# Patient Record
Sex: FEMALE | Race: BLACK OR AFRICAN AMERICAN | Employment: FULL TIME | URBAN - METROPOLITAN AREA
[De-identification: names, ages, dates, MRNs, and addresses within clinical notes are randomized per-mention and may not be internally consistent; named-entity substitution may affect disease eponyms.]

---

## 2017-02-15 ENCOUNTER — OFFICE VISIT (OUTPATIENT)
Dept: INTERNAL MEDICINE CLINIC | Age: 37
End: 2017-02-15

## 2017-02-15 VITALS
SYSTOLIC BLOOD PRESSURE: 100 MMHG | TEMPERATURE: 98.2 F | HEIGHT: 60 IN | DIASTOLIC BLOOD PRESSURE: 64 MMHG | WEIGHT: 234 LBS | BODY MASS INDEX: 45.94 KG/M2 | HEART RATE: 64 BPM

## 2017-02-15 DIAGNOSIS — J45.20 MILD INTERMITTENT ASTHMA WITHOUT COMPLICATION: Primary | ICD-10-CM

## 2017-02-15 PROCEDURE — 99213 OFFICE O/P EST LOW 20 MIN: CPT | Performed by: INTERNAL MEDICINE

## 2017-02-15 RX ORDER — ALBUTEROL SULFATE 90 UG/1
2 AEROSOL, METERED RESPIRATORY (INHALATION) EVERY 6 HOURS PRN
Qty: 1 INHALER | Refills: 3 | Status: SHIPPED | OUTPATIENT
Start: 2017-02-15 | End: 2017-04-06 | Stop reason: SDUPTHER

## 2017-02-15 RX ORDER — MONTELUKAST SODIUM 10 MG/1
10 TABLET ORAL DAILY
Qty: 90 TABLET | Refills: 3 | Status: SHIPPED | OUTPATIENT
Start: 2017-02-15 | End: 2017-04-06 | Stop reason: SDUPTHER

## 2017-02-15 ASSESSMENT — ENCOUNTER SYMPTOMS
CHOKING: 0
WHEEZING: 1
SHORTNESS OF BREATH: 0
COUGH: 0

## 2017-04-06 ENCOUNTER — TELEPHONE (OUTPATIENT)
Dept: INTERNAL MEDICINE CLINIC | Age: 37
End: 2017-04-06

## 2017-04-06 RX ORDER — ALBUTEROL SULFATE 90 UG/1
2 AEROSOL, METERED RESPIRATORY (INHALATION) EVERY 6 HOURS PRN
Qty: 3 INHALER | Refills: 1 | Status: SHIPPED | OUTPATIENT
Start: 2017-04-06 | End: 2018-12-12 | Stop reason: SDUPTHER

## 2017-04-06 RX ORDER — MONTELUKAST SODIUM 10 MG/1
10 TABLET ORAL DAILY
Qty: 90 TABLET | Refills: 1 | Status: SHIPPED | OUTPATIENT
Start: 2017-04-06 | End: 2018-02-08 | Stop reason: SDUPTHER

## 2017-05-09 ENCOUNTER — OFFICE VISIT (OUTPATIENT)
Dept: BARIATRICS/WEIGHT MGMT | Age: 37
End: 2017-05-09

## 2017-05-09 VITALS
SYSTOLIC BLOOD PRESSURE: 122 MMHG | WEIGHT: 236.5 LBS | DIASTOLIC BLOOD PRESSURE: 80 MMHG | HEART RATE: 68 BPM | BODY MASS INDEX: 46.43 KG/M2 | HEIGHT: 60 IN

## 2017-05-09 DIAGNOSIS — E66.01 MORBID OBESITY WITH BODY MASS INDEX (BMI) OF 45.0 TO 49.9 IN ADULT (HCC): Primary | ICD-10-CM

## 2017-05-09 PROCEDURE — 99213 OFFICE O/P EST LOW 20 MIN: CPT | Performed by: FAMILY MEDICINE

## 2017-05-09 ASSESSMENT — ENCOUNTER SYMPTOMS
RESPIRATORY NEGATIVE: 1
GASTROINTESTINAL NEGATIVE: 1
EYES NEGATIVE: 1

## 2017-05-10 DIAGNOSIS — E66.01 MORBID OBESITY WITH BODY MASS INDEX (BMI) OF 45.0 TO 49.9 IN ADULT (HCC): ICD-10-CM

## 2017-05-10 LAB
A/G RATIO: 1.4 (ref 1.1–2.2)
ALBUMIN SERPL-MCNC: 4 G/DL (ref 3.4–5)
ALP BLD-CCNC: 72 U/L (ref 40–129)
ALT SERPL-CCNC: 10 U/L (ref 10–40)
ANION GAP SERPL CALCULATED.3IONS-SCNC: 14 MMOL/L (ref 3–16)
AST SERPL-CCNC: 10 U/L (ref 15–37)
BASOPHILS ABSOLUTE: 0 K/UL (ref 0–0.2)
BASOPHILS RELATIVE PERCENT: 0.3 %
BILIRUB SERPL-MCNC: 0.5 MG/DL (ref 0–1)
BUN BLDV-MCNC: 12 MG/DL (ref 7–20)
CALCIUM SERPL-MCNC: 9.1 MG/DL (ref 8.3–10.6)
CHLORIDE BLD-SCNC: 104 MMOL/L (ref 99–110)
CHOLESTEROL, TOTAL: 144 MG/DL (ref 0–199)
CO2: 22 MMOL/L (ref 21–32)
CREAT SERPL-MCNC: 0.7 MG/DL (ref 0.6–1.1)
EOSINOPHILS ABSOLUTE: 0.7 K/UL (ref 0–0.6)
EOSINOPHILS RELATIVE PERCENT: 10.3 %
FOLATE: 10.8 NG/ML (ref 4.78–24.2)
GFR AFRICAN AMERICAN: >60
GFR NON-AFRICAN AMERICAN: >60
GLOBULIN: 2.8 G/DL
GLUCOSE BLD-MCNC: 88 MG/DL (ref 70–99)
HCT VFR BLD CALC: 42.1 % (ref 36–48)
HDLC SERPL-MCNC: 55 MG/DL (ref 40–60)
HEMOGLOBIN: 13.5 G/DL (ref 12–16)
LDL CHOLESTEROL CALCULATED: 79 MG/DL
LYMPHOCYTES ABSOLUTE: 1.7 K/UL (ref 1–5.1)
LYMPHOCYTES RELATIVE PERCENT: 25.3 %
MCH RBC QN AUTO: 29 PG (ref 26–34)
MCHC RBC AUTO-ENTMCNC: 32 G/DL (ref 31–36)
MCV RBC AUTO: 90.7 FL (ref 80–100)
MONOCYTES ABSOLUTE: 0.4 K/UL (ref 0–1.3)
MONOCYTES RELATIVE PERCENT: 5.4 %
NEUTROPHILS ABSOLUTE: 3.8 K/UL (ref 1.7–7.7)
NEUTROPHILS RELATIVE PERCENT: 58.7 %
PDW BLD-RTO: 14 % (ref 12.4–15.4)
PLATELET # BLD: 248 K/UL (ref 135–450)
PMV BLD AUTO: 8.2 FL (ref 5–10.5)
POTASSIUM SERPL-SCNC: 4.4 MMOL/L (ref 3.5–5.1)
RBC # BLD: 4.65 M/UL (ref 4–5.2)
SODIUM BLD-SCNC: 140 MMOL/L (ref 136–145)
TOTAL PROTEIN: 6.8 G/DL (ref 6.4–8.2)
TRIGL SERPL-MCNC: 50 MG/DL (ref 0–150)
TSH REFLEX: 1.07 UIU/ML (ref 0.27–4.2)
VITAMIN B-12: 306 PG/ML (ref 211–911)
VITAMIN D 25-HYDROXY: 16.4 NG/ML
VLDLC SERPL CALC-MCNC: 10 MG/DL
WBC # BLD: 6.5 K/UL (ref 4–11)

## 2017-05-11 LAB
ESTIMATED AVERAGE GLUCOSE: 111.2 MG/DL
HBA1C MFR BLD: 5.5 %

## 2017-06-06 ENCOUNTER — TELEPHONE (OUTPATIENT)
Dept: BARIATRICS/WEIGHT MGMT | Age: 37
End: 2017-06-06

## 2017-06-21 ENCOUNTER — OFFICE VISIT (OUTPATIENT)
Dept: BARIATRICS/WEIGHT MGMT | Age: 37
End: 2017-06-21

## 2017-06-21 VITALS
HEIGHT: 60 IN | WEIGHT: 233.5 LBS | BODY MASS INDEX: 45.84 KG/M2 | DIASTOLIC BLOOD PRESSURE: 80 MMHG | HEART RATE: 64 BPM | SYSTOLIC BLOOD PRESSURE: 118 MMHG

## 2017-06-21 DIAGNOSIS — Z71.3 DIETARY COUNSELING AND SURVEILLANCE: ICD-10-CM

## 2017-06-21 DIAGNOSIS — E55.9 VITAMIN D DEFICIENCY: ICD-10-CM

## 2017-06-21 DIAGNOSIS — E66.01 MORBID OBESITY WITH BODY MASS INDEX (BMI) OF 45.0 TO 49.9 IN ADULT (HCC): Primary | ICD-10-CM

## 2017-06-21 PROCEDURE — 99213 OFFICE O/P EST LOW 20 MIN: CPT | Performed by: FAMILY MEDICINE

## 2017-06-21 ASSESSMENT — ENCOUNTER SYMPTOMS
GASTROINTESTINAL NEGATIVE: 1
RESPIRATORY NEGATIVE: 1
EYES NEGATIVE: 1

## 2017-06-27 ENCOUNTER — TELEPHONE (OUTPATIENT)
Dept: INTERNAL MEDICINE CLINIC | Age: 37
End: 2017-06-27

## 2017-06-29 ENCOUNTER — OFFICE VISIT (OUTPATIENT)
Dept: INTERNAL MEDICINE CLINIC | Age: 37
End: 2017-06-29

## 2017-06-29 VITALS
BODY MASS INDEX: 45.75 KG/M2 | WEIGHT: 233 LBS | DIASTOLIC BLOOD PRESSURE: 60 MMHG | HEART RATE: 64 BPM | TEMPERATURE: 98.7 F | HEIGHT: 60 IN | SYSTOLIC BLOOD PRESSURE: 100 MMHG

## 2017-06-29 DIAGNOSIS — Z23 NEED FOR PNEUMOCOCCAL VACCINATION: ICD-10-CM

## 2017-06-29 DIAGNOSIS — J45.30 MILD PERSISTENT ASTHMA WITHOUT COMPLICATION: Primary | ICD-10-CM

## 2017-06-29 DIAGNOSIS — E66.01 MORBID OBESITY WITH BMI OF 45.0-49.9, ADULT (HCC): ICD-10-CM

## 2017-06-29 PROCEDURE — 90471 IMMUNIZATION ADMIN: CPT | Performed by: INTERNAL MEDICINE

## 2017-06-29 PROCEDURE — 90732 PPSV23 VACC 2 YRS+ SUBQ/IM: CPT | Performed by: INTERNAL MEDICINE

## 2017-06-29 PROCEDURE — 99213 OFFICE O/P EST LOW 20 MIN: CPT | Performed by: INTERNAL MEDICINE

## 2017-06-29 RX ORDER — BUDESONIDE AND FORMOTEROL FUMARATE DIHYDRATE 80; 4.5 UG/1; UG/1
2 AEROSOL RESPIRATORY (INHALATION) 2 TIMES DAILY
COMMUNITY
End: 2018-01-16 | Stop reason: SDUPTHER

## 2017-06-29 ASSESSMENT — ENCOUNTER SYMPTOMS: WHEEZING: 1

## 2017-09-11 ENCOUNTER — OFFICE VISIT (OUTPATIENT)
Dept: INTERNAL MEDICINE CLINIC | Age: 37
End: 2017-09-11

## 2017-09-11 VITALS
SYSTOLIC BLOOD PRESSURE: 102 MMHG | TEMPERATURE: 98 F | WEIGHT: 230 LBS | HEIGHT: 60 IN | DIASTOLIC BLOOD PRESSURE: 60 MMHG | BODY MASS INDEX: 45.16 KG/M2 | HEART RATE: 72 BPM

## 2017-09-11 DIAGNOSIS — J45.20 MILD INTERMITTENT ASTHMA WITHOUT COMPLICATION: ICD-10-CM

## 2017-09-11 DIAGNOSIS — Z00.00 ANNUAL PHYSICAL EXAM: Primary | ICD-10-CM

## 2017-09-11 PROCEDURE — 99395 PREV VISIT EST AGE 18-39: CPT | Performed by: INTERNAL MEDICINE

## 2017-09-11 ASSESSMENT — PATIENT HEALTH QUESTIONNAIRE - PHQ9
2. FEELING DOWN, DEPRESSED OR HOPELESS: 0
SUM OF ALL RESPONSES TO PHQ QUESTIONS 1-9: 0
1. LITTLE INTEREST OR PLEASURE IN DOING THINGS: 0
SUM OF ALL RESPONSES TO PHQ9 QUESTIONS 1 & 2: 0

## 2017-09-11 ASSESSMENT — ENCOUNTER SYMPTOMS
DIARRHEA: 0
ABDOMINAL DISTENTION: 0
COUGH: 0
CHEST TIGHTNESS: 0
WHEEZING: 0
SHORTNESS OF BREATH: 0
NAUSEA: 0
ABDOMINAL PAIN: 0

## 2017-11-14 RX ORDER — METRONIDAZOLE 500 MG/1
500 TABLET ORAL 2 TIMES DAILY
Qty: 14 TABLET | Refills: 0 | Status: SHIPPED | OUTPATIENT
Start: 2017-11-14 | End: 2017-11-21

## 2017-11-18 DIAGNOSIS — N39.0 URINARY TRACT INFECTION WITHOUT HEMATURIA, SITE UNSPECIFIED: Primary | ICD-10-CM

## 2017-11-18 DIAGNOSIS — R82.2 BILIRUBINURIA: ICD-10-CM

## 2017-11-18 LAB
A/G RATIO: 1.5 (ref 1.1–2.2)
ALBUMIN SERPL-MCNC: 4.3 G/DL (ref 3.4–5)
ALP BLD-CCNC: 69 U/L (ref 40–129)
ALT SERPL-CCNC: 13 U/L (ref 10–40)
ANION GAP SERPL CALCULATED.3IONS-SCNC: -6 MMOL/L (ref 3–16)
AST SERPL-CCNC: 12 U/L (ref 15–37)
BACTERIA: ABNORMAL /HPF
BILIRUB SERPL-MCNC: 0.7 MG/DL (ref 0–1)
BILIRUBIN URINE: ABNORMAL
BLOOD, URINE: NEGATIVE
BUN BLDV-MCNC: 9 MG/DL (ref 7–20)
CALCIUM SERPL-MCNC: 9.7 MG/DL (ref 8.3–10.6)
CHLORIDE BLD-SCNC: 115 MMOL/L (ref 99–110)
CLARITY: CLEAR
CO2: 24 MMOL/L (ref 21–32)
COLOR: ABNORMAL
CREAT SERPL-MCNC: 0.7 MG/DL (ref 0.6–1.1)
EPITHELIAL CELLS, UA: 3 /HPF (ref 0–5)
GFR AFRICAN AMERICAN: >60
GFR NON-AFRICAN AMERICAN: >60
GLOBULIN: 2.9 G/DL
GLUCOSE BLD-MCNC: 90 MG/DL (ref 70–99)
GLUCOSE URINE: NEGATIVE MG/DL
HYALINE CASTS: 1 /LPF (ref 0–8)
KETONES, URINE: NEGATIVE MG/DL
LEUKOCYTE ESTERASE, URINE: ABNORMAL
MICROSCOPIC EXAMINATION: YES
NITRITE, URINE: NEGATIVE
PH UA: 6
POTASSIUM SERPL-SCNC: 4.5 MMOL/L (ref 3.5–5.1)
PROTEIN UA: NEGATIVE MG/DL
RBC UA: 5 /HPF (ref 0–4)
SODIUM BLD-SCNC: 133 MMOL/L (ref 136–145)
SPECIFIC GRAVITY UA: 1.02
TOTAL PROTEIN: 7.2 G/DL (ref 6.4–8.2)
URINE TYPE: ABNORMAL
UROBILINOGEN, URINE: 0.2 E.U./DL
WBC UA: 4 /HPF (ref 0–5)

## 2017-11-22 LAB
ORGANISM: ABNORMAL
URINE CULTURE, ROUTINE: ABNORMAL
URINE CULTURE, ROUTINE: ABNORMAL

## 2018-01-16 ENCOUNTER — OFFICE VISIT (OUTPATIENT)
Dept: INTERNAL MEDICINE CLINIC | Age: 38
End: 2018-01-16

## 2018-01-16 VITALS
HEIGHT: 59 IN | HEART RATE: 60 BPM | BODY MASS INDEX: 47.21 KG/M2 | DIASTOLIC BLOOD PRESSURE: 62 MMHG | WEIGHT: 234.2 LBS | TEMPERATURE: 97.8 F | SYSTOLIC BLOOD PRESSURE: 100 MMHG

## 2018-01-16 DIAGNOSIS — F43.21 SITUATIONAL DEPRESSION: Primary | ICD-10-CM

## 2018-01-16 DIAGNOSIS — F41.1 GAD (GENERALIZED ANXIETY DISORDER): ICD-10-CM

## 2018-01-16 PROCEDURE — 99213 OFFICE O/P EST LOW 20 MIN: CPT | Performed by: INTERNAL MEDICINE

## 2018-01-16 PROCEDURE — 96127 BRIEF EMOTIONAL/BEHAV ASSMT: CPT | Performed by: INTERNAL MEDICINE

## 2018-01-16 RX ORDER — BUDESONIDE AND FORMOTEROL FUMARATE DIHYDRATE 80; 4.5 UG/1; UG/1
2 AEROSOL RESPIRATORY (INHALATION) 2 TIMES DAILY
Qty: 3 INHALER | Refills: 3 | Status: SHIPPED | OUTPATIENT
Start: 2018-01-16 | End: 2018-12-12 | Stop reason: SDUPTHER

## 2018-01-16 RX ORDER — CLONAZEPAM 0.25 MG/1
0.25 TABLET, ORALLY DISINTEGRATING ORAL NIGHTLY PRN
Qty: 30 TABLET | Refills: 0 | Status: SHIPPED | OUTPATIENT
Start: 2018-01-16 | End: 2018-09-13 | Stop reason: ALTCHOICE

## 2018-01-16 RX ORDER — ESCITALOPRAM OXALATE 10 MG/1
10 TABLET ORAL DAILY
Qty: 90 TABLET | Refills: 0 | Status: SHIPPED | OUTPATIENT
Start: 2018-01-16 | End: 2018-02-08 | Stop reason: SDUPTHER

## 2018-01-16 ASSESSMENT — PATIENT HEALTH QUESTIONNAIRE - PHQ9
2. FEELING DOWN, DEPRESSED OR HOPELESS: 2
5. POOR APPETITE OR OVEREATING: 3
8. MOVING OR SPEAKING SO SLOWLY THAT OTHER PEOPLE COULD HAVE NOTICED. OR THE OPPOSITE, BEING SO FIGETY OR RESTLESS THAT YOU HAVE BEEN MOVING AROUND A LOT MORE THAN USUAL: 1
1. LITTLE INTEREST OR PLEASURE IN DOING THINGS: 2
3. TROUBLE FALLING OR STAYING ASLEEP: 3
SUM OF ALL RESPONSES TO PHQ QUESTIONS 1-9: 19
7. TROUBLE CONCENTRATING ON THINGS, SUCH AS READING THE NEWSPAPER OR WATCHING TELEVISION: 3
9. THOUGHTS THAT YOU WOULD BE BETTER OFF DEAD, OR OF HURTING YOURSELF: 0
6. FEELING BAD ABOUT YOURSELF - OR THAT YOU ARE A FAILURE OR HAVE LET YOURSELF OR YOUR FAMILY DOWN: 3
SUM OF ALL RESPONSES TO PHQ9 QUESTIONS 1 & 2: 4
4. FEELING TIRED OR HAVING LITTLE ENERGY: 2

## 2018-01-16 ASSESSMENT — ANXIETY QUESTIONNAIRES
GAD7 TOTAL SCORE: 21
1. FEELING NERVOUS, ANXIOUS, OR ON EDGE: 3-NEARLY EVERY DAY
2. NOT BEING ABLE TO STOP OR CONTROL WORRYING: 3-NEARLY EVERY DAY
7. FEELING AFRAID AS IF SOMETHING AWFUL MIGHT HAPPEN: 3-NEARLY EVERY DAY
6. BECOMING EASILY ANNOYED OR IRRITABLE: 3-NEARLY EVERY DAY
4. TROUBLE RELAXING: 3-NEARLY EVERY DAY
3. WORRYING TOO MUCH ABOUT DIFFERENT THINGS: 3-NEARLY EVERY DAY
5. BEING SO RESTLESS THAT IT IS HARD TO SIT STILL: 3-NEARLY EVERY DAY

## 2018-02-08 ENCOUNTER — OFFICE VISIT (OUTPATIENT)
Dept: INTERNAL MEDICINE CLINIC | Age: 38
End: 2018-02-08

## 2018-02-08 VITALS
DIASTOLIC BLOOD PRESSURE: 68 MMHG | WEIGHT: 234 LBS | BODY MASS INDEX: 47.17 KG/M2 | RESPIRATION RATE: 16 BRPM | TEMPERATURE: 98.1 F | SYSTOLIC BLOOD PRESSURE: 110 MMHG | HEIGHT: 59 IN

## 2018-02-08 DIAGNOSIS — F32.A DEPRESSIVE DISORDER: Primary | ICD-10-CM

## 2018-02-08 PROCEDURE — 99213 OFFICE O/P EST LOW 20 MIN: CPT | Performed by: INTERNAL MEDICINE

## 2018-02-08 PROCEDURE — 96127 BRIEF EMOTIONAL/BEHAV ASSMT: CPT | Performed by: INTERNAL MEDICINE

## 2018-02-08 RX ORDER — ESCITALOPRAM OXALATE 20 MG/1
TABLET ORAL
Qty: 30 TABLET | Refills: 3 | Status: SHIPPED | OUTPATIENT
Start: 2018-02-08 | End: 2018-09-13 | Stop reason: ALTCHOICE

## 2018-02-08 RX ORDER — MONTELUKAST SODIUM 10 MG/1
10 TABLET ORAL DAILY
Qty: 90 TABLET | Refills: 1 | Status: SHIPPED | OUTPATIENT
Start: 2018-02-08 | End: 2018-12-12 | Stop reason: SDUPTHER

## 2018-02-08 ASSESSMENT — PATIENT HEALTH QUESTIONNAIRE - PHQ9
3. TROUBLE FALLING OR STAYING ASLEEP: 3
1. LITTLE INTEREST OR PLEASURE IN DOING THINGS: 2
5. POOR APPETITE OR OVEREATING: 3
9. THOUGHTS THAT YOU WOULD BE BETTER OFF DEAD, OR OF HURTING YOURSELF: 0
SUM OF ALL RESPONSES TO PHQ9 QUESTIONS 1 & 2: 4
6. FEELING BAD ABOUT YOURSELF - OR THAT YOU ARE A FAILURE OR HAVE LET YOURSELF OR YOUR FAMILY DOWN: 2
7. TROUBLE CONCENTRATING ON THINGS, SUCH AS READING THE NEWSPAPER OR WATCHING TELEVISION: 2
2. FEELING DOWN, DEPRESSED OR HOPELESS: 2
8. MOVING OR SPEAKING SO SLOWLY THAT OTHER PEOPLE COULD HAVE NOTICED. OR THE OPPOSITE, BEING SO FIGETY OR RESTLESS THAT YOU HAVE BEEN MOVING AROUND A LOT MORE THAN USUAL: 1
4. FEELING TIRED OR HAVING LITTLE ENERGY: 1
SUM OF ALL RESPONSES TO PHQ QUESTIONS 1-9: 16

## 2018-02-08 ASSESSMENT — ANXIETY QUESTIONNAIRES
6. BECOMING EASILY ANNOYED OR IRRITABLE: 2-OVER HALF THE DAYS
7. FEELING AFRAID AS IF SOMETHING AWFUL MIGHT HAPPEN: 3-NEARLY EVERY DAY
GAD7 TOTAL SCORE: 19
2. NOT BEING ABLE TO STOP OR CONTROL WORRYING: 3-NEARLY EVERY DAY
1. FEELING NERVOUS, ANXIOUS, OR ON EDGE: 3-NEARLY EVERY DAY
5. BEING SO RESTLESS THAT IT IS HARD TO SIT STILL: 2-OVER HALF THE DAYS
4. TROUBLE RELAXING: 3-NEARLY EVERY DAY
3. WORRYING TOO MUCH ABOUT DIFFERENT THINGS: 3-NEARLY EVERY DAY

## 2018-03-08 ENCOUNTER — TELEPHONE (OUTPATIENT)
Dept: INTERNAL MEDICINE CLINIC | Age: 38
End: 2018-03-08

## 2018-03-12 RX ORDER — FLUCONAZOLE 150 MG/1
150 TABLET ORAL ONCE
Qty: 1 TABLET | Refills: 0 | Status: SHIPPED | OUTPATIENT
Start: 2018-03-12 | End: 2018-03-12

## 2018-04-07 ENCOUNTER — OFFICE VISIT (OUTPATIENT)
Dept: INTERNAL MEDICINE CLINIC | Age: 38
End: 2018-04-07

## 2018-04-07 VITALS
HEIGHT: 59 IN | HEART RATE: 89 BPM | WEIGHT: 234 LBS | DIASTOLIC BLOOD PRESSURE: 80 MMHG | TEMPERATURE: 99 F | OXYGEN SATURATION: 99 % | BODY MASS INDEX: 47.17 KG/M2 | SYSTOLIC BLOOD PRESSURE: 120 MMHG

## 2018-04-07 DIAGNOSIS — R50.9 FEBRILE ILLNESS, ACUTE: ICD-10-CM

## 2018-04-07 DIAGNOSIS — J02.9 SORE THROAT: ICD-10-CM

## 2018-04-07 LAB
INFLUENZA A ANTIBODY: NORMAL
INFLUENZA B ANTIBODY: NORMAL
S PYO AG THROAT QL: NORMAL

## 2018-04-07 PROCEDURE — 87804 INFLUENZA ASSAY W/OPTIC: CPT | Performed by: INTERNAL MEDICINE

## 2018-04-07 PROCEDURE — 87880 STREP A ASSAY W/OPTIC: CPT | Performed by: INTERNAL MEDICINE

## 2018-04-07 PROCEDURE — 99213 OFFICE O/P EST LOW 20 MIN: CPT | Performed by: INTERNAL MEDICINE

## 2018-04-07 ASSESSMENT — ENCOUNTER SYMPTOMS
SINUS PAIN: 0
EYES NEGATIVE: 1
COUGH: 0
VOICE CHANGE: 0
RHINORRHEA: 0
SORE THROAT: 1
SHORTNESS OF BREATH: 0
GASTROINTESTINAL NEGATIVE: 1
TROUBLE SWALLOWING: 0
FACIAL SWELLING: 0
SINUS PRESSURE: 0

## 2018-04-17 ENCOUNTER — EMPLOYEE WELLNESS (OUTPATIENT)
Dept: OTHER | Age: 38
End: 2018-04-17

## 2018-04-17 LAB
CHOLESTEROL, TOTAL: 152 MG/DL (ref 0–199)
GLUCOSE BLD-MCNC: 78 MG/DL (ref 70–99)
HDLC SERPL-MCNC: 52 MG/DL (ref 40–60)
LDL CHOLESTEROL CALCULATED: 88 MG/DL
TRIGL SERPL-MCNC: 62 MG/DL (ref 0–150)

## 2018-04-23 VITALS — WEIGHT: 230 LBS | BODY MASS INDEX: 46.45 KG/M2

## 2018-09-13 ENCOUNTER — OFFICE VISIT (OUTPATIENT)
Dept: INTERNAL MEDICINE CLINIC | Age: 38
End: 2018-09-13

## 2018-09-13 VITALS
SYSTOLIC BLOOD PRESSURE: 96 MMHG | HEIGHT: 59 IN | TEMPERATURE: 98.3 F | BODY MASS INDEX: 45.64 KG/M2 | DIASTOLIC BLOOD PRESSURE: 62 MMHG | HEART RATE: 64 BPM | WEIGHT: 226.4 LBS

## 2018-09-13 DIAGNOSIS — Z00.00 ANNUAL PHYSICAL EXAM: Primary | ICD-10-CM

## 2018-09-13 DIAGNOSIS — E55.9 VITAMIN D DEFICIENCY: ICD-10-CM

## 2018-09-13 DIAGNOSIS — J45.20 MILD INTERMITTENT ASTHMA WITHOUT COMPLICATION: ICD-10-CM

## 2018-09-13 PROCEDURE — 99395 PREV VISIT EST AGE 18-39: CPT | Performed by: INTERNAL MEDICINE

## 2018-09-13 ASSESSMENT — ENCOUNTER SYMPTOMS
DIARRHEA: 0
ABDOMINAL DISTENTION: 0
VOMITING: 0
ABDOMINAL PAIN: 0
CONSTIPATION: 0
CHEST TIGHTNESS: 0
WHEEZING: 0
COUGH: 0
SHORTNESS OF BREATH: 0
NAUSEA: 0

## 2018-09-13 NOTE — PROGRESS NOTES
Subjective:      Patient ID: Lita Gonzalez is a 40 y.o. female. HPI  Here for annual  No recent health concerns  She has been using rescue inhalrt  4 times a week      Prior to Visit Medications    Medication Sig Taking? Authorizing Provider   montelukast (SINGULAIR) 10 MG tablet Take 1 tablet by mouth daily Yes Lea Love MD   Cholecalciferol (VITAMIN D3) 5000 units TABS Take 1 tablet by mouth daily Yes Lea Love MD   budesonide-formoterol (SYMBICORT) 80-4.5 MCG/ACT AERO Inhale 2 puffs into the lungs 2 times daily Yes Lea Love MD   albuterol sulfate HFA (PROVENTIL HFA) 108 (90 BASE) MCG/ACT inhaler Inhale 2 puffs into the lungs every 6 hours as needed for Wheezing Yes Lea Love MD     Patient's current med list, family, social history and past medical history reviewed and updated today  Health maintenance reviewed and appropriate screenings, immunizations discussed       Review of Systems   Constitutional: Negative for unexpected weight change (has lost weight intentionally on keto diet ). Respiratory: Negative for cough, chest tightness, shortness of breath and wheezing. Cardiovascular: Negative for chest pain, palpitations and leg swelling. Gastrointestinal: Negative for abdominal distention, abdominal pain, constipation, diarrhea, nausea and vomiting. Genitourinary: Negative for menstrual problem and vaginal bleeding. Neurological: Negative for dizziness, weakness, light-headedness and headaches. Objective:   Physical Exam   Constitutional: She is oriented to person, place, and time. She appears well-developed and well-nourished. HENT:   Right Ear: External ear normal.   Left Ear: External ear normal.   Mouth/Throat: No oropharyngeal exudate. Eyes: Pupils are equal, round, and reactive to light. Conjunctivae and EOM are normal.   Neck: No thyromegaly present. Cardiovascular: Normal rate, regular rhythm, normal heart sounds and intact distal pulses.

## 2018-12-12 RX ORDER — BUDESONIDE AND FORMOTEROL FUMARATE DIHYDRATE 80; 4.5 UG/1; UG/1
2 AEROSOL RESPIRATORY (INHALATION) 2 TIMES DAILY
Qty: 3 INHALER | Refills: 3 | Status: SHIPPED | OUTPATIENT
Start: 2018-12-12

## 2018-12-12 RX ORDER — MONTELUKAST SODIUM 10 MG/1
10 TABLET ORAL DAILY
Qty: 90 TABLET | Refills: 1 | Status: SHIPPED | OUTPATIENT
Start: 2018-12-12 | End: 2020-03-18 | Stop reason: SDUPTHER

## 2018-12-12 RX ORDER — ALBUTEROL SULFATE 90 UG/1
2 AEROSOL, METERED RESPIRATORY (INHALATION) EVERY 6 HOURS PRN
Qty: 3 INHALER | Refills: 1 | Status: SHIPPED | OUTPATIENT
Start: 2018-12-12 | End: 2020-03-18 | Stop reason: SDUPTHER

## 2019-03-11 ENCOUNTER — HOSPITAL ENCOUNTER (OUTPATIENT)
Dept: GENERAL RADIOLOGY | Age: 39
Discharge: HOME | End: 2019-03-11
Payer: COMMERCIAL

## 2019-03-11 ENCOUNTER — HOSPITAL ENCOUNTER (OUTPATIENT)
Dept: VASCULAR LAB | Age: 39
Discharge: HOME | End: 2019-03-11
Payer: COMMERCIAL

## 2019-03-11 DIAGNOSIS — R09.89 SUSPECTED DEEP VEIN THROMBOSIS (DVT): ICD-10-CM

## 2019-03-11 DIAGNOSIS — R09.89 SUSPECTED DEEP VEIN THROMBOSIS (DVT): Primary | ICD-10-CM

## 2019-03-11 PROCEDURE — 93971 EXTREMITY STUDY: CPT

## 2019-03-11 PROCEDURE — 73562 X-RAY EXAM OF KNEE 3: CPT

## 2019-03-13 ENCOUNTER — OFFICE VISIT (OUTPATIENT)
Dept: INTERNAL MEDICINE CLINIC | Age: 39
End: 2019-03-13
Payer: COMMERCIAL

## 2019-03-13 VITALS — OXYGEN SATURATION: 97 % | DIASTOLIC BLOOD PRESSURE: 62 MMHG | HEART RATE: 98 BPM | SYSTOLIC BLOOD PRESSURE: 112 MMHG

## 2019-03-13 DIAGNOSIS — R41.3 MEMORY CHANGES: ICD-10-CM

## 2019-03-13 DIAGNOSIS — Z00.00 PHYSICAL EXAM, ANNUAL: Primary | ICD-10-CM

## 2019-03-13 DIAGNOSIS — M25.562 CHRONIC PAIN OF LEFT KNEE: ICD-10-CM

## 2019-03-13 DIAGNOSIS — G89.29 CHRONIC PAIN OF LEFT KNEE: ICD-10-CM

## 2019-03-13 DIAGNOSIS — Z72.821 INADEQUATE SLEEP HYGIENE: ICD-10-CM

## 2019-03-13 PROCEDURE — 99395 PREV VISIT EST AGE 18-39: CPT | Performed by: INTERNAL MEDICINE

## 2019-03-13 ASSESSMENT — PATIENT HEALTH QUESTIONNAIRE - PHQ9
2. FEELING DOWN, DEPRESSED OR HOPELESS: 0
SUM OF ALL RESPONSES TO PHQ9 QUESTIONS 1 & 2: 0
1. LITTLE INTEREST OR PLEASURE IN DOING THINGS: 0
SUM OF ALL RESPONSES TO PHQ QUESTIONS 1-9: 0
SUM OF ALL RESPONSES TO PHQ QUESTIONS 1-9: 0

## 2019-03-20 DIAGNOSIS — M25.561 ACUTE PAIN OF RIGHT KNEE: Primary | ICD-10-CM

## 2019-03-27 DIAGNOSIS — E78.2 MIXED HYPERLIPIDEMIA: ICD-10-CM

## 2019-03-27 DIAGNOSIS — E55.9 VITAMIN D DEFICIENCY: ICD-10-CM

## 2019-03-27 DIAGNOSIS — M19.90 ARTHRITIS: ICD-10-CM

## 2019-03-27 DIAGNOSIS — I10 ESSENTIAL HYPERTENSION: Primary | ICD-10-CM

## 2019-03-31 ASSESSMENT — ENCOUNTER SYMPTOMS
EYES NEGATIVE: 1
GASTROINTESTINAL NEGATIVE: 1

## 2019-09-23 DIAGNOSIS — M72.2 PLANTAR FASCIITIS, BILATERAL: Primary | ICD-10-CM

## 2019-09-24 ENCOUNTER — OFFICE VISIT (OUTPATIENT)
Dept: ORTHOPEDIC SURGERY | Age: 39
End: 2019-09-24
Payer: COMMERCIAL

## 2019-09-24 VITALS — WEIGHT: 236 LBS | BODY MASS INDEX: 47.58 KG/M2 | HEIGHT: 59 IN

## 2019-09-24 DIAGNOSIS — M25.561 CHRONIC PAIN OF BOTH KNEES: Primary | ICD-10-CM

## 2019-09-24 DIAGNOSIS — G89.29 CHRONIC PAIN OF RIGHT KNEE: ICD-10-CM

## 2019-09-24 DIAGNOSIS — M25.561 CHRONIC PAIN OF RIGHT KNEE: ICD-10-CM

## 2019-09-24 DIAGNOSIS — M17.11 PRIMARY OSTEOARTHRITIS OF RIGHT KNEE: Primary | ICD-10-CM

## 2019-09-24 DIAGNOSIS — M25.562 CHRONIC PAIN OF BOTH KNEES: Primary | ICD-10-CM

## 2019-09-24 DIAGNOSIS — G89.29 CHRONIC PAIN OF BOTH KNEES: Primary | ICD-10-CM

## 2019-09-24 PROCEDURE — 99203 OFFICE O/P NEW LOW 30 MIN: CPT | Performed by: PHYSICIAN ASSISTANT

## 2019-09-24 RX ORDER — NAPROXEN 500 MG/1
500 TABLET ORAL 2 TIMES DAILY
Qty: 60 TABLET | Refills: 1 | Status: SHIPPED | OUTPATIENT
Start: 2019-09-24 | End: 2021-07-17

## 2019-09-24 NOTE — PROGRESS NOTES
Patient Name:Le Narayanan  Medical Record Number: [id number]  YOB: 1980  Date of Encounter: 9/24/2019     Chief Complaint   Patient presents with    New Patient     Right knee pain x7-8n months with significant worsening over past few weeks. History of Present Illness:  Angelo Diallo is a 45 y.o. female here for evaluation of her right knee. Her pain assessment is documented below and I reviewed this with her today. Patient states she has been having right knee pain for the last 8 months however this has worsened over the last several weeks. She denies any known injury to the right knee but states she has been running 6 miles at least 3 days/week. Patient weighs 236 pounds with a BMI of 47.67. She states most of her pain is along the medial aspect of her right knee but radiates to the posterior knee. She does get some swelling with activity. She denies pain in the right hip or ankle.     Pain Assessment  Location of Pain: Knee  Location Modifiers: Right, Anterior, Superior  Severity of Pain: 10  Quality of Pain: Sharp, Throbbing, Cracking, Popping, Buckling  Duration of Pain: Persistent  Frequency of Pain: Constant  Limiting Behavior: No  Result of Injury: No  Work-Related Injury: No  Are there other pain locations you wish to document?: No    Past Medical History:   Diagnosis Date    Asthma     Gallstones     Nausea & vomiting        Past Surgical History:   Procedure Laterality Date    BREAST REDUCTION SURGERY      CHOLECYSTECTOMY      CYST REMOVAL Right     arm       Current Outpatient Medications   Medication Sig Dispense Refill    naproxen (NAPROSYN) 500 MG tablet Take 1 tablet by mouth 2 times daily 60 tablet 1    diclofenac (FLECTOR) 1.3 % patch Lot [id number] exp 11/2019 6 patch 0    albuterol sulfate HFA (PROVENTIL HFA) 108 (90 Base) MCG/ACT inhaler Inhale 2 puffs into the lungs every 6 hours as needed for Wheezing 3 Inhaler 1    budesonide-formoterol (SYMBICORT) 80-4.5 MCG/ACT AERO Inhale 2 puffs into the lungs 2 times daily 3 Inhaler 3    montelukast (SINGULAIR) 10 MG tablet Take 1 tablet by mouth daily 90 tablet 1    Cholecalciferol (VITAMIN D3) 5000 units TABS Take 1 tablet by mouth daily 30 tablet 2     No current facility-administered medications for this visit. Allergies, social and family histories were reviewed and updated as appropriate. Review of Systems:  Relevant review of systems reviewed and available in the patient's chart under the 'MEDIA' tab. Vital Signs:  Ht 4' 11\" (1.499 m)   Wt 236 lb (107 kg)   BMI 47.67 kg/m²     General Exam:   Constitutional: Patient is adequately groomed with no evidence of malnutrition  Mental Status: The patient is oriented to time, place and person. The patient's mood and affect are appropriate. Lymphatic: The lymphatic examination bilaterally reveals all areas to be without enlargement or induration. Neurological: The patient has good coordination and balance. There is no focal weakness or sensory deficit. Right Knee Examination:    Inspection: Normal muscle contours and no significant limb length discrepancy. No gross atrophy in any particular myotome. There is no obvious swelling or joint effusion of the knee. There are no abrasions, lacerations, contusions, hematomas or ecchymosis. There is no erythema, induration or warmth to suggest an infectious process. Palpation: Patient has tenderness on palpation over the medial joint line. There is mild patellofemoral crepitus. Range of Motion:    Flexion: 115°   Extension: 0°    Strength:  Quad 5 / 5  ; Hamstrings 5 / 5.   Gross motor to hip and ankle intact    Special Tests:    Lachman (ACL) - negative   Posterior Lachman (PCL) - negative    Anterior Drawer (ACL) - negative   Posterior Drawer (PCL) - negative   Pivot shift (ACL) - negative    Posterior sag (PCL) - negative   Kim's Test (Meniscus) - negative   Homans Test (Thrombophlebitis)-

## 2019-11-08 ENCOUNTER — OFFICE VISIT (OUTPATIENT)
Dept: GYNECOLOGY | Age: 39
End: 2019-11-08
Payer: COMMERCIAL

## 2019-11-08 VITALS
DIASTOLIC BLOOD PRESSURE: 64 MMHG | HEART RATE: 76 BPM | HEIGHT: 59 IN | SYSTOLIC BLOOD PRESSURE: 108 MMHG | BODY MASS INDEX: 47.43 KG/M2 | RESPIRATION RATE: 16 BRPM | WEIGHT: 235.25 LBS

## 2019-11-08 DIAGNOSIS — R39.9 UTI SYMPTOMS: ICD-10-CM

## 2019-11-08 DIAGNOSIS — Z11.3 SCREEN FOR STD (SEXUALLY TRANSMITTED DISEASE): ICD-10-CM

## 2019-11-08 DIAGNOSIS — N93.9 ABNORMAL UTERINE BLEEDING: ICD-10-CM

## 2019-11-08 DIAGNOSIS — Z01.419 WELL WOMAN EXAM WITH ROUTINE GYNECOLOGICAL EXAM: Primary | ICD-10-CM

## 2019-11-08 LAB
BACTERIA WET PREP: NORMAL
CLUE CELLS: NORMAL
CONTROL: NORMAL
EPITHELIAL CELLS WET PREP: NORMAL
PREGNANCY TEST URINE, POC: NEGATIVE
RBC WET PREP: NORMAL
SOURCE WET PREP: NORMAL
TRICHOMONAS PREP: NORMAL
WBC WET PREP: NORMAL
YEAST WET PREP: NORMAL

## 2019-11-08 PROCEDURE — 81025 URINE PREGNANCY TEST: CPT | Performed by: OBSTETRICS & GYNECOLOGY

## 2019-11-08 PROCEDURE — 99385 PREV VISIT NEW AGE 18-39: CPT | Performed by: OBSTETRICS & GYNECOLOGY

## 2019-11-08 ASSESSMENT — ENCOUNTER SYMPTOMS
TROUBLE SWALLOWING: 0
ANAL BLEEDING: 0
BLOOD IN STOOL: 0
COUGH: 0
DIARRHEA: 0
NAUSEA: 0
APNEA: 0
VOMITING: 0
SORE THROAT: 0
CONSTIPATION: 0
BACK PAIN: 0
RECTAL PAIN: 0
SHORTNESS OF BREATH: 0
WHEEZING: 0
CHEST TIGHTNESS: 0
ABDOMINAL PAIN: 1
PHOTOPHOBIA: 0
COLOR CHANGE: 0
ABDOMINAL DISTENTION: 0

## 2019-11-10 LAB — URINE CULTURE, ROUTINE: NORMAL

## 2019-11-12 LAB
C TRACH DNA GENITAL QL NAA+PROBE: NEGATIVE
HPV COMMENT: NORMAL
HPV TYPE 16: NOT DETECTED
HPV TYPE 18: NOT DETECTED
HPVOH (OTHER TYPES): NOT DETECTED
N. GONORRHOEAE DNA: NEGATIVE

## 2019-11-15 ENCOUNTER — TELEPHONE (OUTPATIENT)
Dept: PRIMARY CARE CLINIC | Age: 39
End: 2019-11-15

## 2019-12-19 ENCOUNTER — TELEPHONE (OUTPATIENT)
Dept: GYNECOLOGY | Age: 39
End: 2019-12-19

## 2019-12-19 ENCOUNTER — TELEPHONE (OUTPATIENT)
Dept: PRIMARY CARE CLINIC | Age: 39
End: 2019-12-19

## 2020-01-03 ENCOUNTER — TELEPHONE (OUTPATIENT)
Dept: GYNECOLOGY | Age: 40
End: 2020-01-03

## 2020-01-13 ENCOUNTER — TELEPHONE (OUTPATIENT)
Dept: GYNECOLOGY | Age: 40
End: 2020-01-13

## 2020-01-14 DIAGNOSIS — E55.9 VITAMIN D DEFICIENCY: ICD-10-CM

## 2020-01-14 DIAGNOSIS — M19.90 ARTHRITIS: ICD-10-CM

## 2020-01-14 DIAGNOSIS — I10 ESSENTIAL HYPERTENSION: ICD-10-CM

## 2020-01-14 DIAGNOSIS — E78.2 MIXED HYPERLIPIDEMIA: ICD-10-CM

## 2020-01-14 LAB
A/G RATIO: 1.5 (ref 1.1–2.2)
ALBUMIN SERPL-MCNC: 4.3 G/DL (ref 3.4–5)
ALP BLD-CCNC: 67 U/L (ref 40–129)
ALT SERPL-CCNC: 16 U/L (ref 10–40)
ANION GAP SERPL CALCULATED.3IONS-SCNC: 16 MMOL/L (ref 3–16)
AST SERPL-CCNC: 16 U/L (ref 15–37)
BILIRUB SERPL-MCNC: 0.9 MG/DL (ref 0–1)
BUN BLDV-MCNC: 6 MG/DL (ref 7–20)
CALCIUM SERPL-MCNC: 9.6 MG/DL (ref 8.3–10.6)
CHLORIDE BLD-SCNC: 107 MMOL/L (ref 99–110)
CHOLESTEROL, TOTAL: 135 MG/DL (ref 0–199)
CO2: 20 MMOL/L (ref 21–32)
CREAT SERPL-MCNC: 0.8 MG/DL (ref 0.6–1.1)
GFR AFRICAN AMERICAN: >60
GFR NON-AFRICAN AMERICAN: >60
GLOBULIN: 2.8 G/DL
GLUCOSE BLD-MCNC: 101 MG/DL (ref 70–99)
HCT VFR BLD CALC: 40.5 % (ref 36–48)
HDLC SERPL-MCNC: 56 MG/DL (ref 40–60)
HEMOGLOBIN: 13.5 G/DL (ref 12–16)
LDL CHOLESTEROL CALCULATED: 66 MG/DL
MCH RBC QN AUTO: 29.9 PG (ref 26–34)
MCHC RBC AUTO-ENTMCNC: 33.3 G/DL (ref 31–36)
MCV RBC AUTO: 89.8 FL (ref 80–100)
PDW BLD-RTO: 13.3 % (ref 12.4–15.4)
PLATELET # BLD: 284 K/UL (ref 135–450)
PMV BLD AUTO: 8.5 FL (ref 5–10.5)
POTASSIUM SERPL-SCNC: 4.6 MMOL/L (ref 3.5–5.1)
RBC # BLD: 4.51 M/UL (ref 4–5.2)
SEDIMENTATION RATE, ERYTHROCYTE: 10 MM/HR (ref 0–20)
SODIUM BLD-SCNC: 143 MMOL/L (ref 136–145)
TOTAL PROTEIN: 7.1 G/DL (ref 6.4–8.2)
TRIGL SERPL-MCNC: 64 MG/DL (ref 0–150)
TSH REFLEX: 1.12 UIU/ML (ref 0.27–4.2)
VITAMIN D 25-HYDROXY: 28.6 NG/ML
VLDLC SERPL CALC-MCNC: 13 MG/DL
WBC # BLD: 5.6 K/UL (ref 4–11)

## 2020-03-11 ENCOUNTER — EMPLOYEE WELLNESS (OUTPATIENT)
Dept: OTHER | Age: 40
End: 2020-03-11

## 2020-03-11 LAB
CHOLESTEROL, TOTAL: 137 MG/DL (ref 0–199)
GLUCOSE BLD-MCNC: 89 MG/DL (ref 70–99)
HDLC SERPL-MCNC: 59 MG/DL (ref 40–60)
LDL CHOLESTEROL CALCULATED: 69 MG/DL
TRIGL SERPL-MCNC: 47 MG/DL (ref 0–150)

## 2020-03-14 LAB
3-OH-COTININE: <2 NG/ML
COTININE: <2 NG/ML
NICOTINE: <2 NG/ML

## 2020-03-18 ENCOUNTER — TELEPHONE (OUTPATIENT)
Dept: INTERNAL MEDICINE CLINIC | Age: 40
End: 2020-03-18

## 2020-03-18 RX ORDER — ALBUTEROL SULFATE 90 UG/1
2 AEROSOL, METERED RESPIRATORY (INHALATION) EVERY 6 HOURS PRN
Qty: 3 INHALER | Refills: 3 | Status: SHIPPED | OUTPATIENT
Start: 2020-03-18 | End: 2021-07-10 | Stop reason: SDUPTHER

## 2020-03-18 RX ORDER — MONTELUKAST SODIUM 10 MG/1
10 TABLET ORAL DAILY
Qty: 90 TABLET | Refills: 3 | Status: SHIPPED | OUTPATIENT
Start: 2020-03-18 | End: 2021-09-01 | Stop reason: SDUPTHER

## 2020-03-20 ENCOUNTER — TELEPHONE (OUTPATIENT)
Dept: INTERNAL MEDICINE CLINIC | Age: 40
End: 2020-03-20

## 2020-03-26 ENCOUNTER — TELEPHONE (OUTPATIENT)
Dept: INTERNAL MEDICINE CLINIC | Age: 40
End: 2020-03-26

## 2020-04-14 ENCOUNTER — TELEPHONE (OUTPATIENT)
Dept: INTERNAL MEDICINE CLINIC | Age: 40
End: 2020-04-14

## 2020-05-14 ENCOUNTER — TELEPHONE (OUTPATIENT)
Dept: INTERNAL MEDICINE CLINIC | Age: 40
End: 2020-05-14

## 2020-05-18 ENCOUNTER — TELEPHONE (OUTPATIENT)
Dept: INTERNAL MEDICINE CLINIC | Age: 40
End: 2020-05-18

## 2020-05-28 ENCOUNTER — PATIENT MESSAGE (OUTPATIENT)
Dept: INTERNAL MEDICINE CLINIC | Age: 40
End: 2020-05-28

## 2020-05-28 RX ORDER — NORETHINDRONE ACETATE AND ETHINYL ESTRADIOL AND FERROUS FUMARATE 1MG-20(24)
KIT ORAL
Qty: 2 PACKET | Refills: 0 | Status: SHIPPED | OUTPATIENT
Start: 2020-05-28 | End: 2020-10-07 | Stop reason: ALTCHOICE

## 2020-05-29 RX ORDER — NORGESTIMATE AND ETHINYL ESTRADIOL 0.25-0.035
KIT ORAL
Qty: 2 PACKET | Refills: 0 | Status: SHIPPED
Start: 2020-05-29 | End: 2020-10-07 | Stop reason: ALTCHOICE

## 2020-09-08 DIAGNOSIS — Z12.31 ENCOUNTER FOR SCREENING MAMMOGRAM FOR MALIGNANT NEOPLASM OF BREAST: ICD-10-CM

## 2020-09-08 DIAGNOSIS — R21 RASH: Primary | ICD-10-CM

## 2020-09-08 DIAGNOSIS — N93.9 ABNORMAL UTERINE BLEEDING: ICD-10-CM

## 2020-10-07 RX ORDER — LEVONORGESTREL AND ETHINYL ESTRADIOL 0.1-0.02MG
1 KIT ORAL DAILY
Qty: 1 PACKET | Refills: 3 | Status: SHIPPED | OUTPATIENT
Start: 2020-10-07 | End: 2020-10-07 | Stop reason: ALTCHOICE

## 2020-10-07 RX ORDER — LEVONORGESTREL AND ETHINYL ESTRADIOL 0.1-0.02MG
1 KIT ORAL DAILY
Qty: 1 PACKET | Refills: 12 | Status: SHIPPED | OUTPATIENT
Start: 2020-10-07 | End: 2021-08-10

## 2020-10-19 VITALS — BODY MASS INDEX: 46.45 KG/M2 | WEIGHT: 230 LBS

## 2020-11-16 RX ORDER — FLUCONAZOLE 150 MG/1
150 TABLET ORAL ONCE
Qty: 1 TABLET | Refills: 1 | Status: SHIPPED | OUTPATIENT
Start: 2020-11-16 | End: 2020-11-16

## 2020-11-16 RX ORDER — FLUCONAZOLE 150 MG/1
150 TABLET ORAL ONCE
Qty: 1 TABLET | Refills: 0 | Status: SHIPPED | OUTPATIENT
Start: 2020-11-16 | End: 2020-11-16 | Stop reason: SDUPTHER

## 2020-11-23 RX ORDER — VALACYCLOVIR HYDROCHLORIDE 1 G/1
1000 TABLET, FILM COATED ORAL 2 TIMES DAILY
Qty: 14 TABLET | Refills: 0 | Status: SHIPPED | OUTPATIENT
Start: 2020-11-23 | End: 2020-11-30

## 2021-03-24 ENCOUNTER — HOSPITAL ENCOUNTER (OUTPATIENT)
Dept: MAMMOGRAPHY | Age: 41
Discharge: HOME | End: 2021-03-29
Payer: COMMERCIAL

## 2021-03-24 ENCOUNTER — TELEPHONE (OUTPATIENT)
Dept: INTERNAL MEDICINE CLINIC | Age: 41
End: 2021-03-24

## 2021-03-24 DIAGNOSIS — Z12.31 VISIT FOR SCREENING MAMMOGRAM: ICD-10-CM

## 2021-03-24 PROCEDURE — 77063 BREAST TOMOSYNTHESIS BI: CPT

## 2021-07-12 RX ORDER — ALBUTEROL SULFATE 90 UG/1
2 AEROSOL, METERED RESPIRATORY (INHALATION) EVERY 6 HOURS PRN
Qty: 3 INHALER | Refills: 3 | Status: SHIPPED | OUTPATIENT
Start: 2021-07-12

## 2021-07-14 DIAGNOSIS — M19.90 ARTHRITIS: ICD-10-CM

## 2021-07-14 DIAGNOSIS — Z00.00 ENCOUNTER FOR WELLNESS EXAMINATION IN ADULT: Primary | ICD-10-CM

## 2021-07-14 DIAGNOSIS — R73.9 ELEVATED BLOOD SUGAR: ICD-10-CM

## 2021-07-14 DIAGNOSIS — E55.9 VITAMIN D DEFICIENCY: ICD-10-CM

## 2021-07-15 ENCOUNTER — OFFICE VISIT (OUTPATIENT)
Dept: INTERNAL MEDICINE CLINIC | Age: 41
End: 2021-07-15
Payer: COMMERCIAL

## 2021-07-15 VITALS
HEIGHT: 60 IN | DIASTOLIC BLOOD PRESSURE: 60 MMHG | BODY MASS INDEX: 41.03 KG/M2 | HEART RATE: 57 BPM | OXYGEN SATURATION: 96 % | SYSTOLIC BLOOD PRESSURE: 114 MMHG | WEIGHT: 209 LBS

## 2021-07-15 DIAGNOSIS — D50.8 OTHER IRON DEFICIENCY ANEMIA: ICD-10-CM

## 2021-07-15 DIAGNOSIS — E55.9 VITAMIN D DEFICIENCY: ICD-10-CM

## 2021-07-15 DIAGNOSIS — Z00.00 ENCOUNTER FOR WELLNESS EXAMINATION IN ADULT: ICD-10-CM

## 2021-07-15 DIAGNOSIS — J45.20 MILD INTERMITTENT ASTHMA WITHOUT COMPLICATION: ICD-10-CM

## 2021-07-15 DIAGNOSIS — M17.0 PRIMARY OSTEOARTHRITIS OF BOTH KNEES: ICD-10-CM

## 2021-07-15 DIAGNOSIS — Z00.00 PHYSICAL EXAM, ANNUAL: Primary | ICD-10-CM

## 2021-07-15 DIAGNOSIS — R73.9 ELEVATED BLOOD SUGAR: ICD-10-CM

## 2021-07-15 DIAGNOSIS — M19.90 ARTHRITIS: ICD-10-CM

## 2021-07-15 LAB
A/G RATIO: 1.3 (ref 1.1–2.2)
ALBUMIN SERPL-MCNC: 4.2 G/DL (ref 3.4–5)
ALP BLD-CCNC: 73 U/L (ref 40–129)
ALT SERPL-CCNC: 26 U/L (ref 10–40)
ANION GAP SERPL CALCULATED.3IONS-SCNC: 11 MMOL/L (ref 3–16)
AST SERPL-CCNC: 21 U/L (ref 15–37)
BASOPHILS ABSOLUTE: 0 K/UL (ref 0–0.2)
BASOPHILS RELATIVE PERCENT: 0.4 %
BILIRUB SERPL-MCNC: 0.5 MG/DL (ref 0–1)
BUN BLDV-MCNC: 10 MG/DL (ref 7–20)
C-REACTIVE PROTEIN: 5.4 MG/L (ref 0–5.1)
CALCIUM SERPL-MCNC: 9.7 MG/DL (ref 8.3–10.6)
CHLORIDE BLD-SCNC: 106 MMOL/L (ref 99–110)
CHOLESTEROL, TOTAL: 137 MG/DL (ref 0–199)
CO2: 22 MMOL/L (ref 21–32)
CREAT SERPL-MCNC: 0.9 MG/DL (ref 0.6–1.1)
EOSINOPHILS ABSOLUTE: 0.4 K/UL (ref 0–0.6)
EOSINOPHILS RELATIVE PERCENT: 5.7 %
FERRITIN: 77.2 NG/ML (ref 15–150)
GFR AFRICAN AMERICAN: >60
GFR NON-AFRICAN AMERICAN: >60
GLOBULIN: 3.3 G/DL
GLUCOSE BLD-MCNC: 86 MG/DL (ref 70–99)
GLUCOSE BLD-MCNC: 91 MG/DL (ref 70–99)
HCT VFR BLD CALC: 41.1 % (ref 36–48)
HDLC SERPL-MCNC: 60 MG/DL (ref 40–60)
HEMOGLOBIN: 13.9 G/DL (ref 12–16)
IRON SATURATION: 20 % (ref 15–50)
IRON: 69 UG/DL (ref 37–145)
LDL CHOLESTEROL CALCULATED: 68 MG/DL
LYMPHOCYTES ABSOLUTE: 1 K/UL (ref 1–5.1)
LYMPHOCYTES RELATIVE PERCENT: 13.6 %
MCH RBC QN AUTO: 30.8 PG (ref 26–34)
MCHC RBC AUTO-ENTMCNC: 33.8 G/DL (ref 31–36)
MCV RBC AUTO: 91.3 FL (ref 80–100)
MONOCYTES ABSOLUTE: 0.3 K/UL (ref 0–1.3)
MONOCYTES RELATIVE PERCENT: 4.2 %
NEUTROPHILS ABSOLUTE: 5.4 K/UL (ref 1.7–7.7)
NEUTROPHILS RELATIVE PERCENT: 76.1 %
PDW BLD-RTO: 13.6 % (ref 12.4–15.4)
PLATELET # BLD: 226 K/UL (ref 135–450)
PMV BLD AUTO: 7.8 FL (ref 5–10.5)
POTASSIUM SERPL-SCNC: 4.5 MMOL/L (ref 3.5–5.1)
RBC # BLD: 4.51 M/UL (ref 4–5.2)
SODIUM BLD-SCNC: 139 MMOL/L (ref 136–145)
TOTAL IRON BINDING CAPACITY: 348 UG/DL (ref 260–445)
TOTAL PROTEIN: 7.5 G/DL (ref 6.4–8.2)
TRIGL SERPL-MCNC: 47 MG/DL (ref 0–150)
VITAMIN D 25-HYDROXY: 34.3 NG/ML
WBC # BLD: 7.1 K/UL (ref 4–11)

## 2021-07-15 PROCEDURE — 99396 PREV VISIT EST AGE 40-64: CPT | Performed by: INTERNAL MEDICINE

## 2021-07-15 SDOH — ECONOMIC STABILITY: FOOD INSECURITY: WITHIN THE PAST 12 MONTHS, THE FOOD YOU BOUGHT JUST DIDN'T LAST AND YOU DIDN'T HAVE MONEY TO GET MORE.: NEVER TRUE

## 2021-07-15 SDOH — EDUCATIONAL SECURITY: EDUCATION ATTAINMENT
WHAT IS THE HIGHEST LEVEL OF SCHOOL YOU HAVE COMPLETED OR THE HIGHEST DEGREE YOU HAVE RECEIVED?: ASSOCIATE DEGREE: ACADEMIC PROGRAM

## 2021-07-15 SDOH — ECONOMIC STABILITY: FOOD INSECURITY: WITHIN THE PAST 12 MONTHS, YOU WORRIED THAT YOUR FOOD WOULD RUN OUT BEFORE YOU GOT MONEY TO BUY MORE.: NEVER TRUE

## 2021-07-15 SDOH — ECONOMIC STABILITY: INCOME INSECURITY: HOW HARD IS IT FOR YOU TO PAY FOR THE VERY BASICS LIKE FOOD, HOUSING, MEDICAL CARE, AND HEATING?: NOT HARD AT ALL

## 2021-07-15 ASSESSMENT — PATIENT HEALTH QUESTIONNAIRE - PHQ9
SUM OF ALL RESPONSES TO PHQ QUESTIONS 1-9: 0
SUM OF ALL RESPONSES TO PHQ9 QUESTIONS 1 & 2: 0
SUM OF ALL RESPONSES TO PHQ QUESTIONS 1-9: 0
2. FEELING DOWN, DEPRESSED OR HOPELESS: 0
SUM OF ALL RESPONSES TO PHQ QUESTIONS 1-9: 0
1. LITTLE INTEREST OR PLEASURE IN DOING THINGS: 0

## 2021-07-15 ASSESSMENT — SOCIAL DETERMINANTS OF HEALTH (SDOH): HOW HARD IS IT FOR YOU TO PAY FOR THE VERY BASICS LIKE FOOD, HOUSING, MEDICAL CARE, AND HEATING?: NOT HARD AT ALL

## 2021-07-15 NOTE — PROGRESS NOTES
Patient: Kathi Moore is a 36 y.o. female who presents today with the following Chief Complaint(s):    Chief Complaint   Patient presents with    Annual Exam         HPIShe is here for an annual physical exam. She generally feels well, exercises regularly including step class, walking and jogging. Very consistent with these activities only limited by knee arthritis. Sees ortho and has been treated with NSAIDS and cortisone knee injections. Ortho advised decrease step class. Considering viscosupplementation. H/O asthma. Using Breztri MDI. Works better than symbicort. H/O elevated BMI. Admits to binge eating carbs at times. Also BCP for menstrual cycle regulation causes weight gain. She continues to work on more discipline in this area. Persistent vaginal bleeding has been a problem. Evaluated by gyn, and ? related to ovarian cyst. Still in discovery. Current Outpatient Medications   Medication Sig Dispense Refill    albuterol sulfate HFA (PROVENTIL HFA) 108 (90 Base) MCG/ACT inhaler Inhale 2 puffs into the lungs every 6 hours as needed for Wheezing 3 Inhaler 3    levonorgestrel-ethinyl estradiol (AVIANE;ALESSE;LESSINA) 0.1-20 MG-MCG per tablet Take 1 tablet by mouth daily 1 packet 12    Cholecalciferol (VITAMIN D3) 125 MCG (5000 UT) TABS Take 1 tablet by mouth daily 30 tablet 2    Diclofenac Sodium POWD 2 g by Does not apply route 3 times daily Formula #5 Diclo 3% Alex 6% Lido 2% Prilo 2% 240 g 11    montelukast (SINGULAIR) 10 MG tablet Take 1 tablet by mouth daily 90 tablet 3    diclofenac (FLECTOR) 1.3 % patch Lot [id number] exp 11/2019 6 patch 0    budesonide-formoterol (SYMBICORT) 80-4.5 MCG/ACT AERO Inhale 2 puffs into the lungs 2 times daily 3 Inhaler 3    hydrocortisone 2.5 % cream Apply topically 2 times daily. 30 g 0    naproxen (NAPROSYN) 500 MG tablet Take 1 tablet by mouth 2 times daily 60 tablet 1     No current facility-administered medications for this visit. Patient's past medical history, surgical history, family history, medications,and allergies  were all reviewed and updated as appropriate today. Review of Systems   Constitutional:        Elevated BMI at 42. See HPI. HENT: Negative. Eyes: Negative. Respiratory:        Asthma, see HPI. Cardiovascular: Negative. Gastrointestinal: Negative. Genitourinary:        Vaginal bleeding, ovarian cyst. See HPI. Musculoskeletal:        Bilateral knee arthritis, see HPI. Skin: Negative. Neurological: Negative. Psychiatric/Behavioral: Negative. Physical Exam  Constitutional:       General: She is not in acute distress. Appearance: She is well-developed. She is obese. Comments: Gynoid obesity. HENT:      Head: Normocephalic and atraumatic. Right Ear: External ear normal.      Left Ear: External ear normal.      Nose: Nose normal.   Eyes:      General: No scleral icterus. Conjunctiva/sclera: Conjunctivae normal.      Pupils: Pupils are equal, round, and reactive to light. Neck:      Thyroid: No thyromegaly. Cardiovascular:      Rate and Rhythm: Normal rate and regular rhythm. Heart sounds: Normal heart sounds. Pulmonary:      Effort: Pulmonary effort is normal.      Breath sounds: Normal breath sounds. Abdominal:      General: Bowel sounds are normal.      Palpations: Abdomen is soft. There is no mass. Musculoskeletal:      Cervical back: Normal range of motion and neck supple. Comments: Mild bilateral knee crepitance. Extension is minimally limited. Lymphadenopathy:      Cervical: No cervical adenopathy. Skin:     General: Skin is warm and dry. Neurological:      Mental Status: She is alert and oriented to person, place, and time. Deep Tendon Reflexes: Reflexes are normal and symmetric. Psychiatric:         Behavior: Behavior normal.         Thought Content:  Thought content normal.         Judgment: Judgment normal. Vitals:    07/15/21 0915   BP: 114/60   Pulse: 57   SpO2: 96%       Assessment:   Diagnosis Orders   1. Physical exam, annual  Health and wellness advice given. Literature provided. Questions answered. 2. Mild intermittent asthma without complication  Diet, physical activity and weight reduction stressed. Maintenance inhaler   Rescue MDI, prn.    3. Primary osteoarthritis of both knees  Diet, physical activity, supplements, weight reduction discussed. Ortho f/u.    4. BMI 40.0-44.9, adult [facility])  Meal planning and discipline discussed. Consistent physical activity discussed. 5. Other iron deficiency anemia  CBC WITH AUTO DIFFERENTIAL    FERRITIN    IRON AND TIBC   6. Vitamin D deficiency  Diet and supplement discussed. Plan:  See plans above.

## 2021-07-16 ENCOUNTER — OFFICE VISIT (OUTPATIENT)
Dept: OBGYN CLINIC | Age: 41
End: 2021-07-16
Payer: COMMERCIAL

## 2021-07-16 VITALS
SYSTOLIC BLOOD PRESSURE: 112 MMHG | DIASTOLIC BLOOD PRESSURE: 80 MMHG | BODY MASS INDEX: 41.58 KG/M2 | HEIGHT: 60 IN | TEMPERATURE: 98.2 F | WEIGHT: 211.8 LBS

## 2021-07-16 DIAGNOSIS — R10.2 PELVIC PAIN IN FEMALE: ICD-10-CM

## 2021-07-16 DIAGNOSIS — Z98.890 HISTORY OF DILATATION AND CURETTAGE: ICD-10-CM

## 2021-07-16 DIAGNOSIS — Z98.891 HISTORY OF CESAREAN SECTION: ICD-10-CM

## 2021-07-16 DIAGNOSIS — N83.201 BILATERAL OVARIAN CYSTS: ICD-10-CM

## 2021-07-16 DIAGNOSIS — N93.9 ABNORMAL UTERINE BLEEDING: ICD-10-CM

## 2021-07-16 DIAGNOSIS — N83.202 BILATERAL OVARIAN CYSTS: ICD-10-CM

## 2021-07-16 DIAGNOSIS — N92.1 MENOMETRORRHAGIA: Primary | ICD-10-CM

## 2021-07-16 DIAGNOSIS — N93.9 ABNORMAL UTERINE BLEEDING (AUB): Primary | ICD-10-CM

## 2021-07-16 DIAGNOSIS — E66.01 MORBID OBESITY WITH BMI OF 40.0-44.9, ADULT (HCC): ICD-10-CM

## 2021-07-16 LAB
ESTIMATED AVERAGE GLUCOSE: 105.4 MG/DL
HBA1C MFR BLD: 5.3 %

## 2021-07-16 PROCEDURE — 76856 US EXAM PELVIC COMPLETE: CPT | Performed by: OBSTETRICS & GYNECOLOGY

## 2021-07-16 PROCEDURE — 99203 OFFICE O/P NEW LOW 30 MIN: CPT | Performed by: OBSTETRICS & GYNECOLOGY

## 2021-07-16 NOTE — LETTER
[facility] OB/GYN SURGERY [facility] WORKSHEET    Patient Name: Mike Chris  Patient : 1980  Patient Sex: female  Patient SSN:   Patient Address: [address] [place] [address]  Patient Home Phone: [phone] (home)  Cell:   Telephone Information:   Mobile [phone]     Patient Insurance: Payor: [employer] / Plan: [employer] [facility] EMP / Product Type: *No Product type* /   Insurance I.D. #:   Authorization #:   PCP: Charline Newman,      Patient Type:  Outpatient  Anesthesia Type:  general  Surgeon:  Dr. Amy Hernandez  Procedure:  Thermon Nearing robotic total laparoscopic hysterectomy, bilateral salpingectomy, excision of bilateral ovarian cysts, possible bilateral oophorectomy   CPT Code(s):   PRE OP Diagnosis:  Abnormal uterine bleeding (AUB)  , Bilateral ovarian cysts , Pelvic pain in female   ICD-10 Code(s): N93.9 , N83.202 , N83.201 , R10.2    Surgery Date:    Surgery Time:  :  OR Time Needed:  3 hours  Surgery Location:  [address] [place] [address]     Allergies: Allergies   Allergen Reactions    Bactrim [Sulfamethoxazole-Trimethoprim]      She had vomiting  Also a hepatitis like reaction with elevation of transaminases     Pcn [Penicillins]      As child    Ultram [Tramadol Hcl]     Anesthesia S-I-40 [Propofol] Nausea And Vomiting     Does not know which specific anesthesia causes this.      Latex Sensitive? no    Who will do History and Physical?  Charline Newman, DO  Cardiac Clearance Needed?  no  Does patient have pacemaker or implanted cardiac defibrillator? no  Special Equipment:  BIPIN Prasad [address] Name: Lanie Wilhelm, [address] [place] [address] 2021  FAX Number: [phone]      PRE-SURGICAL PHYSICIAN ORDERS    Height:   Ht Readings from Last 1 Encounters:   21 5' (1.524 m)     Weight:   Wt Readings from Last 1 Encounters:   21 211 lb 12.8 oz (96.1 kg)       Pre-surgery testing orders:     *Pre-surgical Anesthesia Orders per Anesthesiologist  CBC see physician orders   *Knee Antithrombic Compression Wraps        Past Medical History:   1. Cold/Chronic Cough/Tuberculosis  No  2. Bronchitis/COPD  No  3. Asthma/SOB  Yes  4. Rheumatic Fever/Heart Murmur  No  5. High Blood Pressure/Low Blood Pressure  No  6. Swelling of Feet/Fluid in Lungs  No  7. Heart Attack/Chest Pain  No  8. Irregular, Fast Heartbeats  No  9. Do you bruise, bleed easily? No  10. Anemia: Type  No  11. Diabetes/Low Blood Sugar  No  12. Are you pregnant? No  13. Last Menstrual Period  2021  14. Serious Illness During Pregnancy  No  15. Breast Disease  No  16. Kidney Disease  No  17. Jaundice/Hepatitis  No  18. Hiatal Hernia/Peptic Ulcer Disease  No  19. Convulsions/Epilepsy/Stroke  No  20. Polio/Meningitis Paralysis  No  21. Back Pain/Slipped Disc  No  22. Psychological Disease  No  23. Thyroid Disease   No  24. Glaucoma/Visual Impairment   No  25. Skeletal Deformities/Defects/Arthritis  Yes  26. Loose Teeth/Caps on Front Teeth  No  27. Have you had any surgery? Yes  28. Any history of anesthesia complication in self or family? No  29. Prostate Disease  No  30. Cancer  No  31. DVT/PE/PVD  No  32.  Weight Lose/Gain  No    Past Medical History:   Diagnosis Date    Asthma     Gallstones     Nausea & vomiting      Past Surgical History:   Past Surgical History:   Procedure Laterality Date    BREAST REDUCTION SURGERY       SECTION      CHOLECYSTECTOMY      CYST REMOVAL Right     arm     Current Outpatient Medications   Medication Sig Dispense Refill    albuterol sulfate HFA (PROVENTIL HFA) 108 (90 Base) MCG/ACT inhaler Inhale 2 puffs into the lungs every 6 hours as needed for Wheezing 3 Inhaler 3    levonorgestrel-ethinyl estradiol (AVIANE;ALESSE;LESSINA) 0.1-20 MG-MCG per tablet Take 1 tablet by mouth daily 1 packet 12    Cholecalciferol (VITAMIN D3) 125 MCG (5000 UT) TABS Take 1 tablet by mouth daily 30 tablet 2    montelukast (SINGULAIR) 10 MG tablet Take 1 tablet by mouth daily 90 tablet 3    budesonide-formoterol (SYMBICORT) 80-4.5 MCG/ACT AERO Inhale 2 puffs into the lungs 2 times daily 3 Inhaler 3    hydrocortisone 2.5 % cream Apply topically 2 times daily. 30 g 0    Diclofenac Sodium POWD 2 g by Does not apply route 3 times daily Formula #5 Diclo 3% Alex 6% Lido 2% Prilo 2% (Patient not taking: Reported on 2021) 240 g 11    naproxen (NAPROSYN) 500 MG tablet Take 1 tablet by mouth 2 times daily 60 tablet 1    diclofenac (FLECTOR) 1.3 % patch Lot [id number] exp 2019 (Patient not taking: Reported on 2021) 6 patch 0     No current facility-administered medications for this visit. IN ACCORDANCE WITH OUR FORMULARY SYSTEM, A GENERIC EQUIVALENT DRUG MAY BE DISPENSED AND ADMINISTERED UNLESS D. A. W. IS WRITTEN WITH THE MEDICATION ORDER  PRE-SURGICAL PHYSICIAN ORDERS:  GYNECOLOGY SURGERY    Patient Name __Le Narayanan_____    _1980__    Surgical Procedure_Davinci robotic total laparoscopic hysterectomy, bilateral salpingectomy, excision of bilateral ovarian cysts, possible bilateral oophorectomy     Date of Surgery___________________             ? Admit as Inpatient    X Outpatient- with a bed     Height_5'_______ Weight___211______    Allergies____  Bactrim [sulfamethoxazole-trimethoprim] Not Specified  She had vomiting Also a hepatitis like reaction with elevation of transaminases    Pcn [penicillins] Not Specified  As child   Ultram [tramadol Hcl] Not Specified     Anesthesia S-i-40 [propofol] Low Nausea And Vomiting Does not know which specific anesthesia causes this. Pre-surgery Testing Orders:  ? Pre-surgical Anesthesia Orders Per Anesthesiologist ? Type & Screen ? RH ABO ? CBC ? Chem 7   ? Serum HCG quantitative ? Serum HCG qualitative ? CXR _____ Reason ? EKG _____reason                           ? Urine Pregnancy on Day of Surgery for all local anesthesia patients ?  Other: Preop Antibiotic Prophylaxis/Hysterectomy/Lap assisted Hysterectomy/Vaginal Hysterectomy-Day of Surgery     Cefazolin IVPB per weight base protocol   Cefazolin 2 grams if <119.9kg   Cefazolin 3 grams if ?120kg (?264 lbs.)    ALTERNATIVE:     (Consider for PCN/Cephalosporin allergic pts)                                                                                                                      Metronidazole 500 mg IVPB x 1 and Levofloxacin 500 mg IVPB x1      Metronidazole 500 mg IVPB x 1 and Gentamicin 1.5 mg/kg IVPB x 1                                                                                                                                    Pre-op Antibiotics Prophylaxis: Pubovaginal Sling-Day of Surgery     Cefazolin IVPB per weight base protocol   Cefazolin 2 grams if <119.9kg   Cefazolin 3 grams if ?120kg (?264 lbs.)      Ampicillin/Sulbactam 3g IVPB       ALTERNATIVE:    Levofloxacin 500 mg IVPB     ALTERNATIVE: (Consider for PCN/Cephalosporin allergic pts)        Clindamycin 900 mg IVPB + Gentamicin 1.5 mg/kg IVPB x 1    Metronidazole 500 mg IVPB x1 and Gentamicin 1.5 mg/kg IVPB x1   ADDITIONAL MEDICATIONS:    DVT PREVENTION:       ? Knee Antithrombic compression wraps    Additional Orders: _____________________________________________________________________ ________________________________________________________________________________                Signature _____________________________________________Date _____________________    Please fax at time of booking the case to the Scheduling office at [phone] [facility] at [phone]                                                                                                                  Patient: _Le Narayanan___________________________________________________  I hereby authorize Dr. Mitchell Ly  and the associate(s) or assistant(s) of his/her choice to perform on (Name of Patient or Me) __________________________________ the following procedures:  Terrence Perezbrittney robotic total laparoscopic hysterectomy, bilateral salpingectomy, excision of bilateral ovarian cysts, possible bilateral oophorectomy and/or to do any other therapeutic procedure that the practitioners judgement may dictate to be advisable for my well-being. The above named practitioner has explained the nature of the procedures, the risks, the benefits and the treatment options to me. I acknowledge that no warranty or guarantee has been made to the results of such procedures or cure. ? I have a Do Not Resuscitate (DNR) order and wish to suspend the DNR status during my surgery and immediate post-operative recovery period. (If patient does not wish to suspend DNR, complete page two of this form.)    1. I hereby authorize the above named practitioner and associate(s) or assistant(s) to provide such additional services to me as deemed reasonable and necessary, including but not limited to the administration and maintenance of anesthesia; procedures involving pathology and radiology; the administration of blood or blood derivatives; and IV procedural sedation. 2. I have been informed that I need, or I may need during treatment, a transfusion of blood and/or one of its products in the interest of my health and proper medical care. The risks and benefits of receiving transfusion(s) have been described to me. These risks exist despite the fact that the blood has been carefully tested. The alternatives to transfusion, including the risks and consequences of not receiving this therapy, have been explained to me.  I understand that although blood and blood products are carefully tested, there is a low risk of hepatitis, HIV transmission (AIDS), fever or allergic reaction or other blood borne disease of adverse reactions; and I agree that no expressed or implied warranty is given by the hospital, any blood bank, or any person or entity as to the blood to blood components so transfused. 3. I recognized that during the course of the operation unforeseen conditions may necessitate additional or different procedures that those set forth above. I therefore further authorize and request that the above named practitioner and associate(s) perform such procedures as are in the practitioners professional judgment necessary and desirable. 4. I consent to the observing, including by , photographing, video/audio, taping or televising of the operations or procedures to be performed including appropriate portions of my body, for medical, scientific, or educational purposes, provided my identity is not revealed by the pictures or by descriptive texts accompanying them. 5. I hereby authorize the entities of [place] to preserve for scientific teaching purposes or to otherwise dispose of dismembered tissue, parts or organs resulting from the procedures authorized above. 6. I authorize the use of my social security number to track any medical device implanted during my surgery. I understand that my number may be released to the [address] [place] [address] Way or the  of the implant (if any). 7. A contrast agent may be required for my imaging procedure. I may experience a warm sensation, hives, nausea, vomiting, or a small amount of bleeding under the skin. In rare instances, more serious complications could be encountered which could include shock, kidney failure, and cardiac arrest.  8. This form has been fully explained to me, and I certify that I understand its contents.     Patient Signature: _______________________________________________________  Patient Representative: ____________________________Relationship: ____________  Witness: _______________________________________ Date/Time: ______________              [facility]  Notice of Pain Agreement    Date: _________________________________    Name: ___Christine Bean___________________ : ____1980_______    Controlled substance/medication agreements are signed agreements between a patient and a doctor specifying the terms and conditions under which the doctor agrees to treat a patient's chronic pain with controlled medications. You are being scheduled for a surgery where a narcotic medication may be prescribed to control your pain after your procedure. It is required that you share information with our office if you are under contract/agreement with another physician. I attest that I   ? DO    ? DO NOT   have a pain contract/ narcotic agreement established with another physician. (If under contract, complete the following information.)    Kevin Physician Name: _____________________________________________    Office Name/Address: ___________________________________________________                                         ___________________________________________________    Office Phone: ___________________________ Fax: __________________________    I understand that if I am not forthcoming with information regarding a pain contract currently in place, it may result in a violation of the contract with that provider. The violation may result in cessation of prescribing any controlled medications. Violation may also result in the dismissal of care from the kevin provider. It is your responsibility as the patient under contract to understand the contract and adhere to the agreement.      Patient Signature: __________________________________  Date: ________________                                                              CONSENT FOR TREATMENT    PATIENT: ____Keine Oracio___________________________________________  I hereby authorize Dr. Prem Castro and the associate(s) or assistant of his/her choice to perform the following procedures:  Trey Shields robotic total laparoscopic hysterectomy, bilateral salpingectomy, excision of bilateral ovarian cysts, possible bilateral oophorectomy  for the purpose of removal of uterus, uterine cervix and/or do any other therapeutic procedure that the practitioners judgment may dictate to be advisable for my well being. The above named practitioner has explained the nature of the procedure, the risks, the benefits, and the treatment options have been explained. I have had a chance to ask questions and agree that all questions have been answered to my satisfaction. I acknowledge that no warranty or guarantee has been made to the results of such procedure. Risks to include but not limited to:  bleeding, infection, pain, need for further procedure, injury to nearby organs (bladder, uterus, bowel), risk of anesthesia, risk of death and infertility. Poor outcome and  risk of menopause if ovaries are removed in conjunction with procedure. 1. I hereby authorize the above named practitioner and associate(s) or assistant to provide such additional services to me deemed reasonable and necessary, including but not limited to administration and maintenance of anesthesia: procedures involving pathology, radiology and IV sedation. 2. I recognize that during the course of the procedure unforeseen conditions may necessitate additional procedures than those set forth above. I therefore further authorize and request that the above named practitioner and associate(s) perform such procedures as are in the practitioners professional judgment necessary and desirable. 3. I hereby authorize the entities of [facility] to preserve for scientific teaching purposes or to otherwise dispose of tissue/specimen resulting from the procedures authorized above. 4. The form has been fully explained to me, and I certify that I understand its contents.     Patient Name _____________________________________  _________________  Please print  Patients Signature ________________________________ Date __________________  (or person authorized to sign for patient)  Witness _________________________________________ Date __________________                                                                            Shraddha Almeida have been scheduled for Davinci robotic total laparoscopic hysterectomy, bilateral salpingectomy, excision of bilateral ovarian cysts, possible bilateral oophorectomy  at [facility] on   Please plan to arrive at     **Please note all dates and times are subject to change. **    Please contact your primary care provider to schedule a \"pre-op H&P\" visit at least one week prior to surgery at your convenience. You will need to have COVID-19 screening prior to your surgery. See attached list for testing sites and hours. Your testing will need to be completed at least 72 hours before your surgery but no more than 7 days prior. You will need to self isolate until your surgery date once you have had your screening test completed. You have been scheduled with Dr. Ruby Wetzel on  at   to sign surgical consents. Your post operative appointment with Dr. Ruby Wetzel has been scheduled for }     Our office has contacted your insurance company to obtain a pre-certification for your procedure. It is YOUR responsibility to contact your insurance company regarding the amount they will cover and what you are responsible for. The following are the CPT (procedure) codes you will need to obtain this information. 74266 , [id number] , 06080    Our office has also spoke with your insurance company regarding your current benefits. You will be responsible for your copayment and any charges not covered by your insurance. **If you have an outstanding balance with our office you will need to make arrangements to pay balances prior to your surgery. Payment plans are available by contacting Physician Billing at [phone]. If you have any questions around how to set up a payment you can reach our office at [phone].  Failure to do so may result in cancelling your surgery or rescheduling to a later date when balance is paid. **    Our office will send a letter after the surgery confirming outstanding balance. Payment plan or payment in full will be due at the post-operative appointment with the office.

## 2021-07-17 PROBLEM — N83.201 BILATERAL OVARIAN CYSTS: Status: ACTIVE | Noted: 2021-07-17

## 2021-07-17 PROBLEM — N93.9 ABNORMAL UTERINE BLEEDING (AUB): Status: ACTIVE | Noted: 2021-07-17

## 2021-07-17 PROBLEM — E66.01 MORBID OBESITY WITH BMI OF 40.0-44.9, ADULT (HCC): Status: ACTIVE | Noted: 2021-07-17

## 2021-07-17 PROBLEM — N83.202 BILATERAL OVARIAN CYSTS: Status: ACTIVE | Noted: 2021-07-17

## 2021-07-17 PROBLEM — Z98.890 HISTORY OF DILATATION AND CURETTAGE: Status: ACTIVE | Noted: 2021-07-17

## 2021-07-17 PROBLEM — Z98.891 HISTORY OF CESAREAN SECTION: Status: ACTIVE | Noted: 2021-07-17

## 2021-07-17 PROBLEM — R10.2 PELVIC PAIN IN FEMALE: Status: ACTIVE | Noted: 2021-07-17

## 2021-07-17 ASSESSMENT — ENCOUNTER SYMPTOMS
CONSTIPATION: 0
VOMITING: 0
NAUSEA: 0
SHORTNESS OF BREATH: 0
ABDOMINAL DISTENTION: 0
ABDOMINAL PAIN: 0
DIARRHEA: 0

## 2021-07-18 NOTE — PROGRESS NOTES
Subjective:      Patient ID: Maria Del Carmen Blackburn is a 36 y.o. female. HPI  37 y/o  female presents for new patient evaluation secondary to continuous vaginal bleeding. Patient has been bleeding for the past 5 weeks. Noted onset of irregularity in 2020. Patient is a former patient of Dr. Dimas Pickens. Has taken OCP course in the past to stop bleeding . Restarted course of OCPs on . Bleeding has started to taper. Has tried Alesse/OCPs in the past with little to no improvement. Currently bleeding--pap smear and examination deferred. Pap smear is scheduled in 2021. Last pap smear was 19--normal (no endocervical cells), negative testing for high risk HPV. Menarche occurred age 15. Menses usually occur every month x 4-5 days, medium flow, no dysmenorrhea. Has noted cramping with current bleeding. Sexually active, occasional dyspareunia, monogamous x 1-2 years, condom use. No birth control other than condoms. Declines STD screening. Denies any desire for future childbearing. Gynecologic history is positive for SAB at 5 months requiring D&C and  at 36 weeks [facility] and cholecystectomy       Review of Systems   Constitutional: Negative for activity change, appetite change, chills, fatigue, fever and unexpected weight change. Respiratory: Negative for shortness of breath. Cardiovascular: Negative for chest pain. Gastrointestinal: Negative for abdominal distention, abdominal pain, constipation, diarrhea, nausea and vomiting. Endocrine: Negative for cold intolerance and heat intolerance. Genitourinary: Positive for dyspareunia, menstrual problem, pelvic pain and vaginal bleeding. Negative for difficulty urinating, dysuria, frequency, genital sores, hematuria, vaginal discharge and vaginal pain. Skin: Negative for rash. Neurological: Negative for headaches. Hematological: Does not bruise/bleed easily.        Objective:   Physical Exam  Vitals and nursing note reviewed. Constitutional:       General: She is not in acute distress. Appearance: Normal appearance. She is well-developed. She is not ill-appearing, toxic-appearing or diaphoretic. Pulmonary:      Effort: Pulmonary effort is normal.   Abdominal:      General: There is no distension. Palpations: Abdomen is soft. Tenderness: There is no abdominal tenderness. There is no guarding. Skin:     General: Skin is warm and dry. Neurological:      Mental Status: She is alert and oriented to person, place, and time. Psychiatric:         Behavior: Behavior normal.         Thought Content: Thought content normal.         Judgment: Judgment normal.       PELVIC ULTRASOUND without DOPPLER INTERROGATION   NON OB    DATE: 7/16/2021    PHYSICIAN: APPLE Guerrero D.O.     SONOGRAPHER: Justa Parmar [facility]    INDICATION: abnormal uterine bleeding    TYPE OF SCAN: vaginal    FINDINGS:    The cul de sac is normal. No free fluid appreciated. The cervix is normal and not enlarged. Nabothian cyst/s is not noted within the uterine cervix. The uterus measures 9.16 cm x 4.99 cm x 4.08 cm. The uterus is anteverted. The endometrium measures 2.75 mm. The myometrium is homogeneous in appearance. No uterine anomalies are noted. The right ovary is present. The right ovary measures 3.66 cm x 3.93 cm x 3.02 cm. Ovary findings: Simple cyst seen measuring 3.24 cm x 2.50 cm x 3.39 cm. The right adnexa is normal.  The left ovary is present. The left ovary measures 5.88 cm x 4.66 cm x 4.75 cm. Ovary findings: Simple cyst seen measuring 4.64 cm x 4.52 cm x 3.55 cm. The left adnexa is normal.    IMPRESSION: Normal uterus. Right ovarian cyst. Left ovarian cyst.      Imaging is limited secondary to bowel gas. The patient is well aware of the limitations of ultrasound in the detection of anomalies of the abdomen and pelvis. Assessment:       Diagnosis Orders   1. Abnormal uterine bleeding (AUB)     2.  Bilateral ovarian cysts     3. Pelvic pain in female     4. History of dilatation and curettage     5. History of  section     6. Morbid obesity with BMI of 40.0-44.9, adult ([address]             Plan:     Approximately 30 minutes spent in counseling and coordination of care with over 50% in direct face to face counseling. Ultrasound findings reviewed with patient. Consider surveillance of ovarian cysts if alternative intervention not pursued. Continue current hormone regimen. Menstrual diary  Options for treatment of bleeding, cramping and chronic issues with menses discussed:  Surveillance, IUD, COCs, depo provera, depo Lupron, Lysteda, endometrial ablation (with reliable form of contraception), hysterectomy, etc.  Risks and benefits discussed. Schedule Davinci robotic total laparoscopic hysterectomy, bilateral salpingectomy, excision of bilateral ovarian cysts, possible bilateral oophorectomy (Goal: ovarian retention) for menometrorrhagia and persistent bilateral ovarian cysts. Consider endometrial biopsy if current bleeding does not resolve with JAMES intervention.   Pap smear next visit           Liam Balbuena,

## 2021-07-20 ENCOUNTER — TELEPHONE (OUTPATIENT)
Dept: OBGYN CLINIC | Age: 41
End: 2021-07-20

## 2021-07-20 NOTE — TELEPHONE ENCOUNTER
Pt calling because she has decided to proceed with the option of IUD. Pt advised she would need to come in office to sign paperwork and start the ordering process. Pt aware IUD will be inserted while on cycle.  Pt will stop by office and bring copy of CC ID card for [employer].

## 2021-07-23 ENCOUNTER — TELEPHONE (OUTPATIENT)
Dept: OBGYN CLINIC | Age: 41
End: 2021-07-23

## 2021-07-23 NOTE — TELEPHONE ENCOUNTER
Called patient regarding her surgery and she wants to wait till possibly November. I told patient I will reach back out as soon as Novembers schedules are loaded in the system.        Routing as a FYI

## 2021-07-30 ENCOUNTER — TELEPHONE (OUTPATIENT)
Dept: OBGYN CLINIC | Age: 41
End: 2021-07-30

## 2021-08-10 ENCOUNTER — OFFICE VISIT (OUTPATIENT)
Dept: GYNECOLOGY | Age: 41
End: 2021-08-10
Payer: COMMERCIAL

## 2021-08-10 VITALS
RESPIRATION RATE: 17 BRPM | HEIGHT: 59 IN | WEIGHT: 208.2 LBS | DIASTOLIC BLOOD PRESSURE: 69 MMHG | BODY MASS INDEX: 41.97 KG/M2 | SYSTOLIC BLOOD PRESSURE: 112 MMHG | HEART RATE: 60 BPM

## 2021-08-10 DIAGNOSIS — N92.6 IRREGULAR MENSTRUAL BLEEDING: ICD-10-CM

## 2021-08-10 DIAGNOSIS — Z01.419 WELL WOMAN EXAM WITH ROUTINE GYNECOLOGICAL EXAM: Primary | ICD-10-CM

## 2021-08-10 DIAGNOSIS — N83.202 LEFT OVARIAN CYST: ICD-10-CM

## 2021-08-10 PROCEDURE — 99213 OFFICE O/P EST LOW 20 MIN: CPT | Performed by: OBSTETRICS & GYNECOLOGY

## 2021-08-10 ASSESSMENT — ENCOUNTER SYMPTOMS
RESPIRATORY NEGATIVE: 1
EYES NEGATIVE: 1
GASTROINTESTINAL NEGATIVE: 1
ABDOMINAL PAIN: 0

## 2021-08-11 NOTE — PROGRESS NOTES
[address] [facility] GYNECOLOGY  [address] [facility] [address]  Dept: [phone]  Dept Fax: [phone]  Loc: [phone]     8/10/2021    Kathi Moore (:  1980) is a 36 y.o. female, here for evaluation of the following medical concerns:    Patient is here for follow-up to irregular bleeding. Patient was thinking about IUD. Wants to know reason why having bleeding issues. Had recent normal US. States had some irregular bleeding before. Gynecologic Exam  This is a recurrent problem. The current episode started more than 1 month ago. The problem occurs intermittently. The problem has been waxing and waning. Pertinent negatives include no abdominal pain. Nothing aggravates the symptoms. She has tried nothing for the symptoms. The treatment provided no relief. Vaginal Bleeding  Pertinent negatives include no abdominal pain. Review of Systems   Constitutional: Negative. HENT: Negative. Eyes: Negative. Respiratory: Negative. Cardiovascular: Negative. Gastrointestinal: Negative. Negative for abdominal pain. Genitourinary: Positive for vaginal bleeding. Musculoskeletal: Negative. Skin: Negative. Neurological: Negative. Psychiatric/Behavioral: Negative.       Date of Birth 1980  Past Medical History:   Diagnosis Date    Asthma     Bilateral ovarian cysts 2021    Gallstones     Morbid obesity with BMI of 40.0-44.9, adult ([address] 2021    Nausea & vomiting      Past Surgical History:   Procedure Laterality Date    BREAST REDUCTION SURGERY       SECTION      CHOLECYSTECTOMY      CYST REMOVAL Right     arm     OB History    Para Term  AB Living   3 1 1   2 1   SAB TAB Ectopic Molar Multiple Live Births   2         1      # Outcome Date GA Lbr Talat/2nd Weight Sex Delivery Anes PTL Lv   3 SAB            2 SAB            1 Term      CS-LTranv        Social History     Socioeconomic History    Marital status: Single     Spouse name: Not on file    Number of children: Not on file    Years of education: Not on file    Highest education level: Not on file   Occupational History    Not on file   Tobacco Use    Smoking status: Never Smoker    Smokeless tobacco: Never Used   Vaping Use    Vaping Use: Never used   Substance and Sexual Activity    Alcohol use: Yes     Alcohol/week: 0.0 standard drinks     Comment: occasional    Drug use: No    Sexual activity: Yes     Partners: Male   Other Topics Concern    Not on file   Social History Narrative    Not on file     Social Determinants of Health     Financial Resource Strain: Low Risk     Difficulty of Paying Living Expenses: Not hard at all   Food Insecurity: No Food Insecurity    Worried About [address] [employer] in the Last Year: Never true    [employer] [employer]  [place] in the Last Year: Never true   Transportation Needs:     Lack of Transportation (Medical):  Lack of Transportation (Non-Medical):    Physical Activity:     Days of Exercise per Week:     Minutes of Exercise per Session:    Stress:     Feeling of Stress :    Social Connections:     Frequency of Communication with Friends and Family:     Frequency of Social Gatherings with Friends and Family:     Attends Restorationism Services:     Active Member of Clubs or Organizations:     Attends Club or Organization Meetings:     Marital Status:    Intimate Partner Violence:     Fear of Current or Ex-Partner:     Emotionally Abused:     Physically Abused:     Sexually Abused: Allergies   Allergen Reactions    Bactrim [Sulfamethoxazole-Trimethoprim]      She had vomiting  Also a hepatitis like reaction with elevation of transaminases     Pcn [Penicillins]      As child    Ultram [Tramadol Hcl]     Anesthesia S-I-40 [Propofol] Nausea And Vomiting     Does not know which specific anesthesia causes this.      Outpatient Medications Marked as Taking for the 8/10/21 encounter (Office Visit) with Abiodun Grace MD   Medication Sig Dispense Refill    medroxyPROGESTERone (PROVERA) 10 MG tablet Take 1 tablet by mouth daily 30 tablet 0    albuterol sulfate HFA (PROVENTIL HFA) 108 (90 Base) MCG/ACT inhaler Inhale 2 puffs into the lungs every 6 hours as needed for Wheezing 3 Inhaler 3    Cholecalciferol (VITAMIN D3) 125 MCG (5000 UT) TABS Take 1 tablet by mouth daily 30 tablet 2    montelukast (SINGULAIR) 10 MG tablet Take 1 tablet by mouth daily 90 tablet 3    budesonide-formoterol (SYMBICORT) 80-4.5 MCG/ACT AERO Inhale 2 puffs into the lungs 2 times daily 3 Inhaler 3     Family History   Problem Relation Age of Onset    High Blood Pressure Mother     High Cholesterol Mother     Diabetes Mother     Heart Disease Father     Diabetes Father     Alcohol Abuse Father     Depression Father     Cancer Maternal Grandmother     Cancer Paternal Grandmother      /69 (Site: Right Upper Arm, Position: Sitting, Cuff Size: Large Adult)   Pulse 60   Resp 17   Ht 4' 11\" (1.499 m)   Wt 208 lb 3.2 oz (94.4 kg)   BMI 42.05 kg/m²       Prior to Visit Medications    Medication Sig Taking?  Authorizing Provider   medroxyPROGESTERone (PROVERA) 10 MG tablet Take 1 tablet by mouth daily Yes Seth Guerrero DO   albuterol sulfate HFA (PROVENTIL HFA) 108 (90 Base) MCG/ACT inhaler Inhale 2 puffs into the lungs every 6 hours as needed for Wheezing Yes Sangeetha Garg MD   Cholecalciferol (VITAMIN D3) 125 MCG (5000 UT) TABS Take 1 tablet by mouth daily Yes Sangeetha Garg MD   montelukast (SINGULAIR) 10 MG tablet Take 1 tablet by mouth daily Yes Sangeetha Garg MD   budesonide-formoterol (SYMBICORT) 80-4.5 MCG/ACT AERO Inhale 2 puffs into the lungs 2 times daily Yes Mario Stringer MD   Diclofenac Sodium POWD 2 g by Does not apply route 3 times daily Formula #5 Diclo 3% Alex 6% Lido 2% Prilo 2%  Patient not taking: Reported on 7/16/2021  Sangeetha Garg MD   diclofenac (FLECTOR) 1.3 % patch Lot [id number] exp 2019  Patient not taking: Reported on 2021  Jana Walton MD        Allergies   Allergen Reactions    Bactrim [Sulfamethoxazole-Trimethoprim]      She had vomiting  Also a hepatitis like reaction with elevation of transaminases     Pcn [Penicillins]      As child    Ultram [Tramadol Hcl]     Anesthesia S-I-40 [Propofol] Nausea And Vomiting     Does not know which specific anesthesia causes this. Past Medical History:   Diagnosis Date    Asthma     Bilateral ovarian cysts 2021    Gallstones     Morbid obesity with BMI of 40.0-44.9, adult ([facility] 2021    Nausea & vomiting        Past Surgical History:   Procedure Laterality Date    BREAST REDUCTION SURGERY       SECTION      CHOLECYSTECTOMY      CYST REMOVAL Right     arm       Social History     Socioeconomic History    Marital status: Single     Spouse name: Not on file    Number of children: Not on file    Years of education: Not on file    Highest education level: Not on file   Occupational History    Not on file   Tobacco Use    Smoking status: Never Smoker    Smokeless tobacco: Never Used   Vaping Use    Vaping Use: Never used   Substance and Sexual Activity    Alcohol use: Yes     Alcohol/week: 0.0 standard drinks     Comment: occasional    Drug use: No    Sexual activity: Yes     Partners: Male   Other Topics Concern    Not on file   Social History Narrative    Not on file     Social Determinants of Health     Financial Resource Strain: Low Risk     Difficulty of Paying Living Expenses: Not hard at all   Food Insecurity: No Food Insecurity    Worried About [address] in the Last Year: Never true    [facility] in the Last Year: Never true   Transportation Needs:     Lack of Transportation (Medical):      Lack of Transportation (Non-Medical):    Physical Activity:     Days of Exercise per Week:     Minutes of Exercise per Session:    Stress:     Feeling of Stress :    Social Connections:     Frequency of Communication with Friends and Family:     Frequency of Social Gatherings with Friends and Family:     Attends Hinduism Services:     Active Member of Clubs or Organizations:     Attends Club or Organization Meetings:     Marital Status:    Intimate Partner Violence:     Fear of Current or Ex-Partner:     Emotionally Abused:     Physically Abused:     Sexually Abused:         Family History   Problem Relation Age of Onset    High Blood Pressure Mother     High Cholesterol Mother     Diabetes Mother     Heart Disease Father     Diabetes Father     Alcohol Abuse Father     Depression Father     Cancer Maternal Grandmother     Cancer Paternal Grandmother        Vitals:    08/10/21 1112   BP: 112/69   Site: Right Upper Arm   Position: Sitting   Cuff Size: Large Adult   Pulse: 60   Resp: 17   Weight: 208 lb 3.2 oz (94.4 kg)   Height: 4' 11\" (1.499 m)     Estimated body mass index is 42.05 kg/m² as calculated from the following:    Height as of this encounter: 4' 11\" (1.499 m). Weight as of this encounter: 208 lb 3.2 oz (94.4 kg). Physical Exam  Constitutional:       Appearance: Normal appearance. She is normal weight. HENT:      Head: Normocephalic. Nose: Nose normal.      Mouth/Throat:      Mouth: Mucous membranes are moist.      Pharynx: Oropharynx is clear. Eyes:      Extraocular Movements: Extraocular movements intact. Pulmonary:      Effort: Pulmonary effort is normal.   Abdominal:      General: Abdomen is flat. There is no distension. Palpations: Abdomen is soft. There is no mass. Tenderness: There is no abdominal tenderness. There is no guarding or rebound. Hernia: No hernia is present. Musculoskeletal:         General: Normal range of motion. Cervical back: Normal range of motion. Skin:     General: Skin is warm and dry. Neurological:      General: No focal deficit present.       Mental Status: She is alert and oriented to

## 2021-08-14 PROBLEM — E55.9 VITAMIN D DEFICIENCY: Status: ACTIVE | Noted: 2021-08-14

## 2021-08-14 PROBLEM — D64.9 ANEMIA: Status: ACTIVE | Noted: 2021-08-14

## 2021-08-14 PROBLEM — M17.0 OSTEOARTHRITIS OF BOTH KNEES: Status: ACTIVE | Noted: 2021-08-14

## 2021-08-14 ASSESSMENT — ENCOUNTER SYMPTOMS
EYES NEGATIVE: 1
GASTROINTESTINAL NEGATIVE: 1

## 2021-08-27 ENCOUNTER — OFFICE VISIT (OUTPATIENT)
Dept: OBGYN CLINIC | Age: 41
End: 2021-08-27
Payer: COMMERCIAL

## 2021-08-27 VITALS
DIASTOLIC BLOOD PRESSURE: 72 MMHG | HEART RATE: 72 BPM | BODY MASS INDEX: 42.25 KG/M2 | SYSTOLIC BLOOD PRESSURE: 108 MMHG | TEMPERATURE: 97.8 F | WEIGHT: 209.2 LBS

## 2021-08-27 DIAGNOSIS — Z30.430 ENCOUNTER FOR IUD INSERTION: Primary | ICD-10-CM

## 2021-08-27 DIAGNOSIS — Z98.891 HISTORY OF CESAREAN SECTION: ICD-10-CM

## 2021-08-27 DIAGNOSIS — N93.9 ABNORMAL UTERINE BLEEDING (AUB): ICD-10-CM

## 2021-08-27 DIAGNOSIS — E66.01 MORBID OBESITY WITH BMI OF 40.0-44.9, ADULT (HCC): ICD-10-CM

## 2021-08-27 PROCEDURE — 58300 INSERT INTRAUTERINE DEVICE: CPT | Performed by: OBSTETRICS & GYNECOLOGY

## 2021-08-28 ASSESSMENT — ENCOUNTER SYMPTOMS
ABDOMINAL PAIN: 0
DIARRHEA: 0
NAUSEA: 0
CONSTIPATION: 0
VOMITING: 0
SHORTNESS OF BREATH: 0
ABDOMINAL DISTENTION: 0

## 2021-08-28 NOTE — PROGRESS NOTES
Subjective:      Patient ID: Rosangela Salgado is a 36 y.o. female. HPI  35 y/o  female presents for Mirena IUD insertion. Patient recently seen on 2021 for evaluation secondary to continuous vaginal bleeding--x 5 weeks. Noted onset of irregularity in 2020. Patient is a former patient of Dr. Radha العلي. Has taken OCP course in the past to stop bleeding. Treated with OCPs prior to recent visit. Pap smear and examination was deferred at last visit due to bleeding. Last pap smear was 19--normal (no endocervical cells), negative testing for high risk HPV. Menarche occurred age 15. Menses usually occur every month x 4-5 days, medium flow, no dysmenorrhea. Has noted cramping with current bleeding. Sexually active, occasional dyspareunia, monogamous x 1-2 years, condom use. No birth control other than condoms. Declines STD screening. Denies any desire for future childbearing. Gynecologic history is positive for SAB at 5 months requiring D&C and  at 42 weeks [facility]'), and cholecystectomy      Review of Systems   Constitutional: Negative. Negative for activity change, appetite change, chills, fatigue, fever and unexpected weight change. Respiratory: Negative for shortness of breath. Cardiovascular: Negative for chest pain. Gastrointestinal: Negative for abdominal distention, abdominal pain, constipation, diarrhea, nausea and vomiting. Genitourinary: Positive for menstrual problem and vaginal bleeding. Negative for difficulty urinating, dysuria, hematuria, pelvic pain, vaginal discharge and vaginal pain. Psychiatric/Behavioral: Negative. Objective:   Physical Exam  Vitals and nursing note reviewed. Exam conducted with a chaperone present. Constitutional:       General: She is not in acute distress. Appearance: Normal appearance. She is well-developed. She is not ill-appearing, toxic-appearing or diaphoretic.    Pulmonary:      Effort: Pulmonary effort is normal.   Abdominal:      General: There is no distension. Tenderness: There is no abdominal tenderness. There is no guarding. Genitourinary:     General: Normal vulva. Exam position: Lithotomy position. Labia:         Right: No rash, tenderness, lesion or injury. Left: No rash, tenderness, lesion or injury. Vagina: No signs of injury and foreign body. Bleeding present. No vaginal discharge, erythema, tenderness or lesions. Cervix: No cervical motion tenderness, discharge, friability, lesion, erythema or cervical bleeding. Uterus: Deviated (retroverted). Skin:     General: Skin is warm and dry. Neurological:      Mental Status: She is alert and oriented to person, place, and time. Psychiatric:         Behavior: Behavior normal.         Thought Content: Thought content normal.         Judgment: Judgment normal.       Procedure:  IUD insertion--Mirena IUD  Written and informed consent--patient signed formal consent as well as  supplied consent. Risks and benefits were discussed. Patient was then taken to exam room and positioned in the dorsal lithotomy position. Speculum was then placed. Cervix was bathed in betadine in usual sterile fashion. Single tooth tenaculum was then applied to the anterior lip of the cervix. The uterus was then sounded to a depth of 8 centimeters. The Mirena IUD was then placed according to  instructions. The applicator was then removed. The IUD string was then cut at a generous length and the single tooth tenaculum was removed. Excellent hemostasis was assured. The speculum was then removed. The patient tolerated the procedure well. Assessment:       Diagnosis Orders   1. Encounter for IUD insertion     2. Abnormal uterine bleeding (AUB)     3. History of  section     4.  Morbid obesity with BMI of 40.0-44.9, adult [facility])             Plan:              Keith Lesch, DO

## 2021-10-06 NOTE — TELEPHONE ENCOUNTER
Called to see if patient was still interested in surgery and she does npt want to proceed at this time.      BEBO

## 2022-03-18 DIAGNOSIS — J45.20 MILD INTERMITTENT ASTHMA WITHOUT COMPLICATION: Primary | ICD-10-CM

## 2022-03-21 RX ORDER — SEMAGLUTIDE 1.34 MG/ML
INJECTION, SOLUTION SUBCUTANEOUS
Qty: 1 PEN | Refills: 0 | COMMUNITY
Start: 2022-03-21

## 2022-10-31 DIAGNOSIS — J02.0 STREP THROAT: Primary | ICD-10-CM

## 2023-02-13 ENCOUNTER — NURSE ONLY (OUTPATIENT)
Dept: INTERNAL MEDICINE CLINIC | Age: 43
End: 2023-02-13

## 2023-02-13 ENCOUNTER — TELEPHONE (OUTPATIENT)
Dept: INTERNAL MEDICINE CLINIC | Age: 43
End: 2023-02-13

## 2023-02-13 DIAGNOSIS — M25.512 LEFT SHOULDER PAIN, UNSPECIFIED CHRONICITY: ICD-10-CM

## 2023-02-13 DIAGNOSIS — M25.512 LEFT SHOULDER PAIN, UNSPECIFIED CHRONICITY: Primary | ICD-10-CM

## 2023-02-13 RX ORDER — TRIAMCINOLONE ACETONIDE 40 MG/ML
40 INJECTION, SUSPENSION INTRA-ARTICULAR; INTRAMUSCULAR ONCE
Qty: 1 ML | Refills: 0 | Status: SHIPPED | OUTPATIENT
Start: 2023-02-13 | End: 2023-02-13 | Stop reason: CLARIF

## 2023-02-13 RX ORDER — KETOROLAC TROMETHAMINE 30 MG/ML
30 INJECTION, SOLUTION INTRAMUSCULAR; INTRAVENOUS ONCE
Qty: 1 ML | Refills: 0
Start: 2023-02-13 | End: 2023-02-13 | Stop reason: CLARIF

## 2023-02-13 RX ADMIN — KETOROLAC TROMETHAMINE 30 MG: 30 INJECTION, SOLUTION INTRAMUSCULAR; INTRAVENOUS at 16:03

## 2023-02-13 RX ADMIN — TRIAMCINOLONE ACETONIDE 40 MG: 40 INJECTION, SUSPENSION INTRA-ARTICULAR; INTRAMUSCULAR at 16:09

## 2023-02-13 NOTE — PATIENT INSTRUCTIONS
[facility] Laboratory Locations - No appointment necessary. @ indicates the location is open Saturdays in addition to Monday through Friday. Call your preferred location for test preparation, business hours and other information you need. [employer] accepts [employer]. [facility]     @ [address] [facility] [address] [facility]. [place], [address] [facility] [address]    Ph: [address]   [address] [facility] [place], [address]    Ph: [phone]   @ [address] [place] [address].[facility]    Ph: Traci [address]    Ph: [phone]  @ [address]   Ph: [phone]  @ [address] [place] [address]. Brian [facility]    Ph: [address] [place] [address] 19   Ph: [phone]    [place]    @ [address] Kimberly Albert.[facility] [address]    Ph: [phone]  [facility]   [address] [facility], [address]   Ph: [address] [facility]. [address], [address]    [address]: [address] [facility] [address] [place]    Ph: [phone]   [facility]   [address] [facility]. Binh Palma   Ph: [address] [facility]  [address]  [address]    Ph: [phone]

## 2023-09-19 LAB
CHOLEST SERPL-MCNC: 159 MG/DL (ref 0–199)
GLUCOSE SERPL-MCNC: 95 MG/DL (ref 70–99)
HDLC SERPL-MCNC: 58 MG/DL (ref 40–60)
LDLC SERPL CALC-MCNC: 87 MG/DL
TRIGL SERPL-MCNC: 68 MG/DL (ref 0–150)

## 2023-12-08 ENCOUNTER — OFFICE VISIT (OUTPATIENT)
Dept: INTERNAL MEDICINE CLINIC | Age: 43
End: 2023-12-08
Payer: COMMERCIAL

## 2023-12-08 VITALS — SYSTOLIC BLOOD PRESSURE: 128 MMHG | OXYGEN SATURATION: 97 % | DIASTOLIC BLOOD PRESSURE: 72 MMHG | HEART RATE: 70 BPM

## 2023-12-08 DIAGNOSIS — Z00.00 ENCOUNTER FOR WELL ADULT EXAM WITHOUT ABNORMAL FINDINGS: Primary | ICD-10-CM

## 2023-12-08 LAB
CHP ED QC CHECK: NORMAL
GLUCOSE BLD-MCNC: 80 MG/DL
HBA1C MFR BLD: 5.2 %

## 2023-12-08 PROCEDURE — 82962 GLUCOSE BLOOD TEST: CPT | Performed by: NURSE PRACTITIONER

## 2023-12-08 PROCEDURE — 99396 PREV VISIT EST AGE 40-64: CPT | Performed by: NURSE PRACTITIONER

## 2023-12-08 PROCEDURE — 83036 HEMOGLOBIN GLYCOSYLATED A1C: CPT | Performed by: NURSE PRACTITIONER

## 2023-12-08 RX ORDER — ALBUTEROL SULFATE 90 UG/1
2 AEROSOL, METERED RESPIRATORY (INHALATION) EVERY 6 HOURS PRN
Qty: 3 EACH | Refills: 3 | Status: SHIPPED | OUTPATIENT
Start: 2023-12-08

## 2023-12-08 ASSESSMENT — ENCOUNTER SYMPTOMS
EYES NEGATIVE: 1
SHORTNESS OF BREATH: 1
GASTROINTESTINAL NEGATIVE: 1
WHEEZING: 1

## 2023-12-08 NOTE — PROGRESS NOTES
Well Adult Note  Name: Wilfrido Patel Date: 2023   MRN: [id number] Sex: Female   Age: 43 y.o. Ethnicity: Non- / Non    : 1980 Race: Black /       Lynsey Leonard is here for well adult exam.  History:  Established patient. Concerned about recent weight gain. Has also been experiencing swelling. Does exercise regularly. Says she does not always eat a healthy diet. Review of Systems   Constitutional:  Positive for unexpected weight change. HENT: Negative. Eyes: Negative. Respiratory:  Positive for shortness of breath and wheezing (Asthma managed with occasion). Gastrointestinal: Negative. Endocrine: Negative. Genitourinary: Negative. Musculoskeletal: Negative. Skin: Negative. Neurological:  Positive for dizziness. Psychiatric/Behavioral: Negative. Allergies   Allergen Reactions    Bactrim [Sulfamethoxazole-Trimethoprim]      She had vomiting  Also a hepatitis like reaction with elevation of transaminases     Pcn [Penicillins]      As child    Ultram [Tramadol Hcl]     Anesthesia S-I-40 [Propofol] Nausea And Vomiting     Does not know which specific anesthesia causes this. Prior to Visit Medications    Medication Sig Taking?  Authorizing Provider   albuterol sulfate HFA (PROVENTIL HFA) 108 (90 Base) MCG/ACT inhaler Inhale 2 puffs into the lungs every 6 hours as needed for Wheezing Yes PORSCHE Tobias   BREZTRI AEROSPHERE 160-9-4.8 MCG/ACT AERO Inhale 1 each into the lungs 2 times daily LOT# [id number] EXP [id number] Yes Yessica Arriola MD   Budeson-Glycopyrrol-Formoterol (BREZTRI AEROSPHERE) 160-9-4.8 MCG/ACT AERO Inhale 1 each into the lungs daily Lot [id number] exp 2023 Yes Yessica Arriola MD   montelukast (SINGULAIR) 10 MG tablet Take 1 tablet by mouth daily Yes Yessica Arriola MD   Cholecalciferol (VITAMIN D3) 125 MCG (5000 UT) TABS Take 1 tablet by mouth daily Yes Yessica Arriola MD   Semaglutide,0.25 or

## 2023-12-08 NOTE — PATIENT INSTRUCTIONS
[address] [facility] Laboratory Locations - No appointment necessary. ? indicates the location is open Saturdays in addition to Monday through Friday. Call your preferred location for test preparation, business hours and other information you need. [employer] accepts [employer]. [facility]    ? [place] [id number] [facility]. [place] [facility], [address]    Ph: [address] [place], [address]    Ph: [phone]   ? [address]    [place], [address] [facility]    Ph: [address] [facility] [address] [place], [address]    Ph: [phone] ? [place]   [address] [facility] [address] [facility]   Ph: [phone]  ? 707 [facility], [address] [facility]    Ph: [place] [address] [facility] [place], [address] [facility]   Ph: [phone]    NORTH    ? [place], [place] [address]    Ph: [phone]  [facility]   [address] [facility], [facility]   Ph: Reginald Kimball [place], [phone] [facility]: [address], [facility]5 [facility]    Ph: [address] [facility]. [facility] [place], [place] [address]    Ph: [phone]             Well Visit, Ages 25 to 72: Care Instructions  Well visits can help you stay healthy. Your doctor has checked your overall health and may have suggested ways to take good care of yourself. Your doctor also may have recommended tests. You can help prevent illness with healthy eating, good sleep, vaccinations, regular exercise, and other steps. Get the tests that you and your doctor decide on. Depending on your age and risks, examples might include screening for diabetes; hepatitis C; HIV; and cervical, breast, lung, and colon cancer. Screening helps find diseases before any symptoms appear. Eat healthy foods.  Choose fruits, vegetables, whole

## 2023-12-12 RX ORDER — MONTELUKAST SODIUM 10 MG/1
10 TABLET ORAL DAILY
Qty: 90 TABLET | Refills: 3 | Status: SHIPPED | OUTPATIENT
Start: 2023-12-12

## 2023-12-12 RX ORDER — MONTELUKAST SODIUM 10 MG/1
10 TABLET ORAL DAILY
Qty: 90 TABLET | Refills: 3 | OUTPATIENT
Start: 2023-12-12

## 2024-05-04 DIAGNOSIS — J45.20 MILD INTERMITTENT ASTHMA WITHOUT COMPLICATION: Primary | ICD-10-CM

## 2024-05-04 RX ORDER — ALBUTEROL SULFATE AND BUDESONIDE 90; 80 UG/1; UG/1
AEROSOL, METERED RESPIRATORY (INHALATION)
Qty: 10.7 G | Refills: 1 | Status: SHIPPED | OUTPATIENT
Start: 2024-05-04

## 2024-05-04 NOTE — PROGRESS NOTES
Patient audibly wheezing. Add Airsupra to Breztri. Advised regular cardiovascular work out and weight reduction, diet changes.

## 2024-07-19 DIAGNOSIS — J45.20 MILD INTERMITTENT ASTHMA WITHOUT COMPLICATION: ICD-10-CM

## 2024-07-19 RX ORDER — MONTELUKAST SODIUM 10 MG/1
10 TABLET ORAL DAILY
Qty: 90 TABLET | Refills: 3 | Status: SHIPPED | OUTPATIENT
Start: 2024-07-19

## 2024-07-19 RX ORDER — ALBUTEROL SULFATE AND BUDESONIDE 90; 80 UG/1; UG/1
AEROSOL, METERED RESPIRATORY (INHALATION)
Qty: 10.7 G | Refills: 1 | Status: SHIPPED | OUTPATIENT
Start: 2024-07-19

## 2024-07-22 RX ORDER — CLOBETASOL PROPIONATE 0.5 MG/G
CREAM TOPICAL
Qty: 45 G | Refills: 0 | Status: SHIPPED | OUTPATIENT
Start: 2024-07-22

## 2024-11-15 ENCOUNTER — OFFICE VISIT (OUTPATIENT)
Dept: GYNECOLOGY | Age: 44
End: 2024-11-15

## 2024-11-15 VITALS
OXYGEN SATURATION: 98 % | SYSTOLIC BLOOD PRESSURE: 112 MMHG | HEART RATE: 85 BPM | DIASTOLIC BLOOD PRESSURE: 74 MMHG | BODY MASS INDEX: 52.11 KG/M2 | WEIGHT: 258 LBS

## 2024-11-15 DIAGNOSIS — R11.2 NAUSEA AND VOMITING, UNSPECIFIED VOMITING TYPE: ICD-10-CM

## 2024-11-15 DIAGNOSIS — K52.9 GASTROENTERITIS: ICD-10-CM

## 2024-11-15 DIAGNOSIS — R63.5 WEIGHT GAIN: Primary | ICD-10-CM

## 2024-11-15 DIAGNOSIS — Z97.5 PRESENCE OF 52 MG LEVONORGESTREL-RELEASING INTRAUTERINE DEVICE (IUD): ICD-10-CM

## 2024-11-15 PROCEDURE — 99203 OFFICE O/P NEW LOW 30 MIN: CPT | Performed by: OBSTETRICS & GYNECOLOGY

## 2024-11-15 RX ORDER — ONDANSETRON 4 MG/1
4 TABLET, FILM COATED ORAL EVERY 8 HOURS PRN
Qty: 30 TABLET | Refills: 0 | Status: SHIPPED | OUTPATIENT
Start: 2024-11-15

## 2024-11-15 ASSESSMENT — PATIENT HEALTH QUESTIONNAIRE - PHQ9: DEPRESSION UNABLE TO ASSESS: PT REFUSES

## 2024-11-15 NOTE — PROGRESS NOTES
Chief complaint: Pelvic Pain (Mirena since Aug of 21 /Would like removed )    History of present illness: Le Narayanan 43 y.o. female No LMP recorded. (Menstrual status: IUD).  Who presents with complaint of weight gain/inability to lose weight  The patient reports that she had a Mirena IUD placed back in 2021.  This was done for menstrual management because of heavy cycles.  She reports that this is done well in managing her menstruation.  The patient reports that she has gained weight and has had difficulty losing weight.  She thinks this is related to her Mirena IUD.  She reports reading on the Internet and noting that she has not found any information regarding this however, she does not know what else is changed for her over this time but could be causing her weight gain/inability to lose weight.  This was discussed with her.  We discussed menstrual management options and ongoing menstrual management with respect to her current successful treatment.  Other management options were reviewed.  Side effects were vitals were reviewed.    The patient has decided this point that she wants to keep her IUD.  She knows that she could follow-up at a later date if she so desired for removal.    The patient reports that she start developing nausea yesterday and has had emesis today.  This is unlikely to be related to her IUD.  The patient may have a gastroenteritis.  We discussed empiric treatment with Zofran to see if this would help.  We discussed following up in the ER if needed.  We discussed potential treatment options and the patient notes that she does not have anyone to drive her home if she was provided with medication that was sedating.      Patient Active Problem List   Diagnosis    Mild intermittent asthma without complication    Pelvic pain in female    Bilateral ovarian cysts    Abnormal uterine bleeding (AUB)    History of dilatation and curettage    History of  section    Morbid obesity with

## 2024-12-02 ENCOUNTER — PATIENT MESSAGE (OUTPATIENT)
Dept: OBGYN CLINIC | Age: 44
End: 2024-12-02

## 2024-12-12 ENCOUNTER — TELEPHONE (OUTPATIENT)
Dept: INTERNAL MEDICINE CLINIC | Age: 44
End: 2024-12-12

## 2024-12-12 RX ORDER — BUPROPION HYDROCHLORIDE 150 MG/1
150 TABLET ORAL EVERY MORNING
Qty: 30 TABLET | Refills: 1 | Status: SHIPPED | OUTPATIENT
Start: 2024-12-12

## 2025-01-28 ENCOUNTER — TELEPHONE (OUTPATIENT)
Dept: GYNECOLOGY | Age: 45
End: 2025-01-28

## 2025-01-28 ENCOUNTER — OFFICE VISIT (OUTPATIENT)
Dept: GYNECOLOGY | Age: 45
End: 2025-01-28
Payer: COMMERCIAL

## 2025-01-28 ENCOUNTER — PREP FOR PROCEDURE (OUTPATIENT)
Dept: GYNECOLOGY | Age: 45
End: 2025-01-28

## 2025-01-28 VITALS
HEART RATE: 83 BPM | SYSTOLIC BLOOD PRESSURE: 126 MMHG | DIASTOLIC BLOOD PRESSURE: 82 MMHG | WEIGHT: 257 LBS | OXYGEN SATURATION: 99 % | BODY MASS INDEX: 51.91 KG/M2

## 2025-01-28 DIAGNOSIS — Z97.5 PRESENCE OF 52 MG LEVONORGESTREL-RELEASING INTRAUTERINE DEVICE (IUD): ICD-10-CM

## 2025-01-28 DIAGNOSIS — N92.0 MENORRHAGIA WITH REGULAR CYCLE: Primary | ICD-10-CM

## 2025-01-28 PROCEDURE — 99214 OFFICE O/P EST MOD 30 MIN: CPT | Performed by: OBSTETRICS & GYNECOLOGY

## 2025-01-28 RX ORDER — SODIUM CHLORIDE, SODIUM LACTATE, POTASSIUM CHLORIDE, CALCIUM CHLORIDE 600; 310; 30; 20 MG/100ML; MG/100ML; MG/100ML; MG/100ML
INJECTION, SOLUTION INTRAVENOUS CONTINUOUS
Status: CANCELLED | OUTPATIENT
Start: 2025-02-13

## 2025-01-28 RX ORDER — SODIUM CHLORIDE 0.9 % (FLUSH) 0.9 %
5-40 SYRINGE (ML) INJECTION PRN
Status: CANCELLED | OUTPATIENT
Start: 2025-02-13

## 2025-01-28 RX ORDER — SODIUM CHLORIDE 0.9 % (FLUSH) 0.9 %
5-40 SYRINGE (ML) INJECTION EVERY 12 HOURS SCHEDULED
Status: CANCELLED | OUTPATIENT
Start: 2025-02-13

## 2025-01-28 RX ORDER — ACETAMINOPHEN 325 MG/1
1000 TABLET ORAL ONCE
Status: CANCELLED | OUTPATIENT
Start: 2025-02-13 | End: 2025-01-28

## 2025-01-28 NOTE — PROGRESS NOTES
Le Narayanan is a 44 y.o. female  who is seen in consultation today for evaluation and treatment of menorrhagia.    Le Narayanan has been suffering from menorrhagia.  She has tried medication in attempts to decrease her bleeding and cramping and is currently using the Mirena IUD.  They have not worked to the patients satisfaction.  The patient wants to proceed with Mirena IUD removal and NovaSure ablation for treatment.    Le Narayanan has completed childbearing and she assures me that she has no intention of future childbearing.  OB History          3    Para   1    Term   1            AB   2    Living   1         SAB   2    IAB        Ectopic        Molar        Multiple        Live Births   1                Patient Active Problem List   Diagnosis    Mild intermittent asthma without complication    Pelvic pain in female    Bilateral ovarian cysts    Abnormal uterine bleeding (AUB)    History of dilatation and curettage    History of  section    Morbid obesity with BMI of 40.0-44.9, adult    Vitamin D deficiency    Anemia    Osteoarthritis of both knees       Review of systems:  No complaints of symptoms involving:  Constitutional: negative for fever, chills.  Eyes: No change in vision, double vision, or scotomata.  HENT: No sore throat, ear pain or nasal congestion.  Respiratory: No cough, shortness of breath or hemoptysis.  Cardiovascular: No chest pain, orthopnea, fainting.  Skin: No pruritus or generalized rash.  Neurologic: No focal weakness or sensory changes.   Gynecologic: see HPI    Past Medical History:   Diagnosis Date    Asthma     Bilateral ovarian cysts 2021    Gallstones     Morbid obesity with BMI of 40.0-44.9, adult 2021    Nausea & vomiting     Osteoarthritis of both knees 2021     Past Surgical History:   Procedure Laterality Date    BREAST REDUCTION SURGERY       SECTION      CHOLECYSTECTOMY      CYST REMOVAL Right     arm     Current Outpatient

## 2025-01-28 NOTE — TELEPHONE ENCOUNTER
Case Scheduled  Received: Today  [facility] Or Case Request Messaging - [facility]  Case for Le Narayanan ([id number]) has been scheduled.    Procedures: Procedure(s):  Hysteroscopy with dilation and curettage, endometrial ablation, IUD removal  Date: 2/13/2025  Time: 1324  Location: [facility] OR

## 2025-02-07 ENCOUNTER — OFFICE VISIT (OUTPATIENT)
Dept: INTERNAL MEDICINE CLINIC | Age: 45
End: 2025-02-07
Payer: COMMERCIAL

## 2025-02-07 VITALS
DIASTOLIC BLOOD PRESSURE: 78 MMHG | BODY MASS INDEX: 51.81 KG/M2 | WEIGHT: 257 LBS | HEART RATE: 94 BPM | HEIGHT: 59 IN | SYSTOLIC BLOOD PRESSURE: 116 MMHG | OXYGEN SATURATION: 98 %

## 2025-02-07 DIAGNOSIS — Z01.818 PREOP EXAMINATION: Primary | ICD-10-CM

## 2025-02-07 PROCEDURE — 99214 OFFICE O/P EST MOD 30 MIN: CPT | Performed by: NURSE PRACTITIONER

## 2025-02-07 SDOH — ECONOMIC STABILITY: FOOD INSECURITY: WITHIN THE PAST 12 MONTHS, THE FOOD YOU BOUGHT JUST DIDN'T LAST AND YOU DIDN'T HAVE MONEY TO GET MORE.: NEVER TRUE

## 2025-02-07 SDOH — ECONOMIC STABILITY: FOOD INSECURITY: WITHIN THE PAST 12 MONTHS, YOU WORRIED THAT YOUR FOOD WOULD RUN OUT BEFORE YOU GOT MONEY TO BUY MORE.: NEVER TRUE

## 2025-02-07 ASSESSMENT — PATIENT HEALTH QUESTIONNAIRE - PHQ9
SUM OF ALL RESPONSES TO PHQ QUESTIONS 1-9: 0
1. LITTLE INTEREST OR PLEASURE IN DOING THINGS: NOT AT ALL
SUM OF ALL RESPONSES TO PHQ9 QUESTIONS 1 & 2: 0
2. FEELING DOWN, DEPRESSED OR HOPELESS: NOT AT ALL
SUM OF ALL RESPONSES TO PHQ QUESTIONS 1-9: 0

## 2025-02-07 NOTE — PROGRESS NOTES
2/7/25 1033: Spoke with Sandra,  for Dr. Bedoya to inform office that pt has Penicillin allergy and that Ancef is currently ordered. - BDP

## 2025-02-07 NOTE — PROGRESS NOTES
Preoperative Consultation      Le Narayanan  YOB: 1980    Date of Service:  2/7/2025    Vitals:    02/07/25 0833   BP: 116/78   Site: Right Upper Arm   Position: Sitting   Cuff Size: Large Adult   Pulse: 94   SpO2: 98%   Weight: 116.6 kg (257 lb)   Height: 1.499 m (4' 11.02\")      Wt Readings from Last 2 Encounters:   02/07/25 116.6 kg (257 lb)   01/28/25 116.6 kg (257 lb)     BP Readings from Last 3 Encounters:   02/07/25 116/78   01/28/25 126/82   11/15/24 112/74        Chief Complaint   Patient presents with    Pre-op Exam     Surgery 2/13/25     Allergies   Allergen Reactions    Bactrim [Sulfamethoxazole-Trimethoprim]      She had vomiting  Also a hepatitis like reaction with elevation of transaminases     Pcn [Penicillins]      As child    Ultram [Tramadol Hcl]     Anesthesia S-I-40 [Propofol] Nausea And Vomiting     Does not know which specific anesthesia causes this.     Outpatient Medications Marked as Taking for the 2/7/25 encounter (Office Visit) with Beckie Baeza APRN   Medication Sig Dispense Refill    buPROPion (WELLBUTRIN XL) 150 MG extended release tablet Take 1 tablet by mouth every morning 30 tablet 1    Albuterol-Budesonide (AIRSUPRA) 90-80 MCG/ACT AERO 2 puffs 4 times daily as needed for asthma 10.7 g 1    montelukast (SINGULAIR) 10 MG tablet Take 1 tablet by mouth daily 90 tablet 3    albuterol sulfate HFA (PROVENTIL HFA) 108 (90 Base) MCG/ACT inhaler Inhale 2 puffs into the lungs every 6 hours as needed for Wheezing 3 each 3    Cholecalciferol (VITAMIN D3) 125 MCG (5000 UT) TABS Take 1 tablet by mouth daily 30 tablet 3       This patient presents to the office today for a preoperative consultation at the request of surgeon, Dr. Parag Bedoya, who plans on performing Hysteroscopy with dilation and curettage, endometrial ablation, IUD removal on February 13 at [facility].  The current problem began several years ago, and symptoms have been unchanged

## 2025-02-07 NOTE — PROGRESS NOTES
[facility] PRE-SURGICAL TESTING INSTRUCTIONS                      PRIOR TO PROCEDURE DATE:    1. PLEASE FOLLOW ANY INSTRUCTIONS GIVEN TO YOU PER YOUR SURGEON.      2. Arrange for someone to drive you home and be with you for the first 24 hours after discharge for your safety after your procedure for which you received sedation. Ensure it is someone we can share information with regarding your discharge.     NOTE: At this time ONLY 2 ADULTS may accompany you   One person ENCOURAGED to stay at hospital entire time if outpatient surgery      3. You must contact your surgeon for instructions IF:  You are taking any blood thinners, aspirin, anti-inflammatory or vitamins.  There is a change in your physical condition such as a cold, fever, rash, cuts, sores, or any other infection, especially near your surgical site.    4. Do not drink alcohol the day before or day of your procedure.  Do not use any recreational marijuana at least 24 hours or street drugs (heroin, cocaine) at minimum 5 days prior to your procedure.     5. A Pre-Surgical History and Physical MUST be completed WITHIN 30 DAYS OR LESS prior to your procedure.by your Physician or an Urgent Care        THE DAY OF YOUR PROCEDURE:  1.  Follow instructions for ARRIVAL TIME as DIRECTED BY YOUR SURGEON.     2. Enter the MAIN entrance from [facility] and follow the signs to the free Parking Garage or  Parking (offered free of charge 7 am-5pm).      3. Enter the Main Entrance of the hospital (do not enter from the lower level of the parking garage). Upon entrance, check in with the  at the surgical information desk on your LEFT.   Bring your insurance card and photo ID to register      4. DO NOT EAT ANYTHING 8 hours prior to arrival for surgery.  You may have up to 8 ounces of water 4 hours prior to your arrival for surgery.   NOTE: ALL Gastric, Bariatric & Bowel surgery patients - you MUST follow your surgeon's instructions regarding  please bring it with you on the day of your procedure.    10. If you use oxygen at home, please bring your oxygen tank with you to hospital..     11. We recommend that valuable personal belongings such as cash, cell phones, e-tablets, or jewelry, be left at home during your stay. The hospital will not be responsible for valuables that are not secured in the hospital safe. However, if your insurance requires a co-pay, you may want to bring a method of payment, i.e., Check or credit card, if you wish to pay your co-pay the day of surgery.      12. If you are to stay overnight, you may bring a bag with personal items. Please have any large items you may need brought in by your family after your arrival to your hospital room.    13. If you have a Living Will or Durable Power of , please bring a copy on the day of your procedure.     How we keep you safe and work to prevent surgical site infections:   1. Health care workers should always check your ID bracelet to verify your name and birth date. You will be asked many times to state your name, date of birth, and allergies.    2. Health care workers should always clean their hands with soap or alcohol gel before providing care to you. It is okay to ask anyone if they cleaned their hands before they touch you.    3. You will be actively involved in verifying the type of procedure you are having and ensuring the correct surgical site. This will be confirmed multiple times prior to your procedure. Do NOT tayo your surgery site UNLESS instructed to by your surgeon.     4. When you are in the operating room, your surgical site will be cleansed with a special soap, and in most cases, you will be given an antibiotic before the surgery begins.      What to expect AFTER your procedure?  1. Immediately following your procedure, your will be taken to the PACU for the first phase of your recovery.  Your nurse will help you recover from any potential side effects of

## 2025-02-13 ENCOUNTER — ANESTHESIA (OUTPATIENT)
Dept: OPERATING ROOM | Age: 45
End: 2025-02-13
Payer: COMMERCIAL

## 2025-02-13 ENCOUNTER — HOSPITAL ENCOUNTER (OUTPATIENT)
Age: 45
Setting detail: OUTPATIENT SURGERY
Discharge: HOME | End: 2025-02-13
Attending: OBSTETRICS & GYNECOLOGY | Admitting: OBSTETRICS & GYNECOLOGY
Payer: COMMERCIAL

## 2025-02-13 ENCOUNTER — ANESTHESIA EVENT (OUTPATIENT)
Dept: OPERATING ROOM | Age: 45
End: 2025-02-13
Payer: COMMERCIAL

## 2025-02-13 VITALS
BODY MASS INDEX: 50.61 KG/M2 | SYSTOLIC BLOOD PRESSURE: 136 MMHG | DIASTOLIC BLOOD PRESSURE: 74 MMHG | WEIGHT: 257.8 LBS | HEIGHT: 60 IN | HEART RATE: 57 BPM | TEMPERATURE: 98.6 F | OXYGEN SATURATION: 99 % | RESPIRATION RATE: 18 BRPM

## 2025-02-13 DIAGNOSIS — N92.0 MENORRHAGIA: ICD-10-CM

## 2025-02-13 DIAGNOSIS — Z97.5 PRESENCE OF 52 MG LEVONORGESTREL-RELEASING INTRAUTERINE DEVICE (IUD): ICD-10-CM

## 2025-02-13 LAB — HCG UR QL: NEGATIVE

## 2025-02-13 PROCEDURE — 58563 HYSTEROSCOPY ABLATION: CPT | Performed by: OBSTETRICS & GYNECOLOGY

## 2025-02-13 PROCEDURE — 3600000014 HC SURGERY LEVEL 4 ADDTL 15MIN: Performed by: OBSTETRICS & GYNECOLOGY

## 2025-02-13 PROCEDURE — 6360000002 HC RX W HCPCS: Performed by: OBSTETRICS & GYNECOLOGY

## 2025-02-13 PROCEDURE — 2720000010 HC SURG SUPPLY STERILE: Performed by: OBSTETRICS & GYNECOLOGY

## 2025-02-13 PROCEDURE — 3600000004 HC SURGERY LEVEL 4 BASE: Performed by: OBSTETRICS & GYNECOLOGY

## 2025-02-13 PROCEDURE — 2500000003 HC RX 250 WO HCPCS: Performed by: OBSTETRICS & GYNECOLOGY

## 2025-02-13 PROCEDURE — 2580000003 HC RX 258: Performed by: ANESTHESIOLOGY

## 2025-02-13 PROCEDURE — 88305 TISSUE EXAM BY PATHOLOGIST: CPT

## 2025-02-13 PROCEDURE — 6360000002 HC RX W HCPCS

## 2025-02-13 PROCEDURE — 84703 CHORIONIC GONADOTROPIN ASSAY: CPT

## 2025-02-13 PROCEDURE — 7100000000 HC PACU RECOVERY - FIRST 15 MIN: Performed by: OBSTETRICS & GYNECOLOGY

## 2025-02-13 PROCEDURE — 2709999900 HC NON-CHARGEABLE SUPPLY: Performed by: OBSTETRICS & GYNECOLOGY

## 2025-02-13 PROCEDURE — 3700000000 HC ANESTHESIA ATTENDED CARE: Performed by: OBSTETRICS & GYNECOLOGY

## 2025-02-13 PROCEDURE — 3700000001 HC ADD 15 MINUTES (ANESTHESIA): Performed by: OBSTETRICS & GYNECOLOGY

## 2025-02-13 PROCEDURE — 6370000000 HC RX 637 (ALT 250 FOR IP): Performed by: ANESTHESIOLOGY

## 2025-02-13 PROCEDURE — 7100000011 HC PHASE II RECOVERY - ADDTL 15 MIN: Performed by: OBSTETRICS & GYNECOLOGY

## 2025-02-13 PROCEDURE — 6360000002 HC RX W HCPCS: Performed by: ANESTHESIOLOGY

## 2025-02-13 PROCEDURE — 7100000001 HC PACU RECOVERY - ADDTL 15 MIN: Performed by: OBSTETRICS & GYNECOLOGY

## 2025-02-13 PROCEDURE — 6370000000 HC RX 637 (ALT 250 FOR IP): Performed by: OBSTETRICS & GYNECOLOGY

## 2025-02-13 PROCEDURE — 58301 REMOVE INTRAUTERINE DEVICE: CPT | Performed by: OBSTETRICS & GYNECOLOGY

## 2025-02-13 PROCEDURE — 7100000010 HC PHASE II RECOVERY - FIRST 15 MIN: Performed by: OBSTETRICS & GYNECOLOGY

## 2025-02-13 PROCEDURE — 2500000003 HC RX 250 WO HCPCS

## 2025-02-13 RX ORDER — APREPITANT 40 MG/1
40 CAPSULE ORAL ONCE
Status: COMPLETED | OUTPATIENT
Start: 2025-02-13 | End: 2025-02-13

## 2025-02-13 RX ORDER — SODIUM CHLORIDE 0.9 % (FLUSH) 0.9 %
5-40 SYRINGE (ML) INJECTION PRN
Status: DISCONTINUED | OUTPATIENT
Start: 2025-02-13 | End: 2025-02-13 | Stop reason: HOSPADM

## 2025-02-13 RX ORDER — PROCHLORPERAZINE EDISYLATE 5 MG/ML
5 INJECTION INTRAMUSCULAR; INTRAVENOUS
Status: DISCONTINUED | OUTPATIENT
Start: 2025-02-13 | End: 2025-02-13 | Stop reason: HOSPADM

## 2025-02-13 RX ORDER — SODIUM CHLORIDE 9 MG/ML
INJECTION, SOLUTION INTRAVENOUS PRN
Status: DISCONTINUED | OUTPATIENT
Start: 2025-02-13 | End: 2025-02-13 | Stop reason: HOSPADM

## 2025-02-13 RX ORDER — ONDANSETRON 2 MG/ML
INJECTION INTRAMUSCULAR; INTRAVENOUS
Status: DISCONTINUED | OUTPATIENT
Start: 2025-02-13 | End: 2025-02-13 | Stop reason: SDUPTHER

## 2025-02-13 RX ORDER — PROPOFOL 10 MG/ML
INJECTION, EMULSION INTRAVENOUS
Status: DISCONTINUED | OUTPATIENT
Start: 2025-02-13 | End: 2025-02-13 | Stop reason: SDUPTHER

## 2025-02-13 RX ORDER — MIDAZOLAM HYDROCHLORIDE 1 MG/ML
INJECTION, SOLUTION INTRAMUSCULAR; INTRAVENOUS
Status: DISCONTINUED | OUTPATIENT
Start: 2025-02-13 | End: 2025-02-13 | Stop reason: SDUPTHER

## 2025-02-13 RX ORDER — SCOPOLAMINE 1 MG/3D
1 PATCH, EXTENDED RELEASE TRANSDERMAL ONCE
Status: DISCONTINUED | OUTPATIENT
Start: 2025-02-13 | End: 2025-02-13 | Stop reason: HOSPADM

## 2025-02-13 RX ORDER — SODIUM CHLORIDE, SODIUM LACTATE, POTASSIUM CHLORIDE, CALCIUM CHLORIDE 600; 310; 30; 20 MG/100ML; MG/100ML; MG/100ML; MG/100ML
INJECTION, SOLUTION INTRAVENOUS CONTINUOUS
Status: DISCONTINUED | OUTPATIENT
Start: 2025-02-13 | End: 2025-02-13 | Stop reason: HOSPADM

## 2025-02-13 RX ORDER — NALOXONE HYDROCHLORIDE 0.4 MG/ML
INJECTION, SOLUTION INTRAMUSCULAR; INTRAVENOUS; SUBCUTANEOUS PRN
Status: DISCONTINUED | OUTPATIENT
Start: 2025-02-13 | End: 2025-02-13 | Stop reason: HOSPADM

## 2025-02-13 RX ORDER — MAGNESIUM HYDROXIDE 1200 MG/15ML
LIQUID ORAL CONTINUOUS PRN
Status: DISCONTINUED | OUTPATIENT
Start: 2025-02-13 | End: 2025-02-13 | Stop reason: HOSPADM

## 2025-02-13 RX ORDER — FENTANYL CITRATE 50 UG/ML
25 INJECTION, SOLUTION INTRAMUSCULAR; INTRAVENOUS EVERY 5 MIN PRN
Status: DISCONTINUED | OUTPATIENT
Start: 2025-02-13 | End: 2025-02-13 | Stop reason: HOSPADM

## 2025-02-13 RX ORDER — LIDOCAINE HYDROCHLORIDE 20 MG/ML
INJECTION, SOLUTION INFILTRATION; PERINEURAL
Status: DISCONTINUED | OUTPATIENT
Start: 2025-02-13 | End: 2025-02-13 | Stop reason: SDUPTHER

## 2025-02-13 RX ORDER — FENTANYL CITRATE 50 UG/ML
INJECTION, SOLUTION INTRAMUSCULAR; INTRAVENOUS
Status: DISCONTINUED | OUTPATIENT
Start: 2025-02-13 | End: 2025-02-13 | Stop reason: SDUPTHER

## 2025-02-13 RX ORDER — SODIUM CHLORIDE 0.9 % (FLUSH) 0.9 %
5-40 SYRINGE (ML) INJECTION EVERY 12 HOURS SCHEDULED
Status: DISCONTINUED | OUTPATIENT
Start: 2025-02-13 | End: 2025-02-13 | Stop reason: HOSPADM

## 2025-02-13 RX ORDER — METHOCARBAMOL 100 MG/ML
1000 INJECTION, SOLUTION INTRAMUSCULAR; INTRAVENOUS ONCE
Status: COMPLETED | OUTPATIENT
Start: 2025-02-13 | End: 2025-02-13

## 2025-02-13 RX ORDER — OXYCODONE HYDROCHLORIDE 5 MG/1
5 TABLET ORAL
Status: DISCONTINUED | OUTPATIENT
Start: 2025-02-13 | End: 2025-02-13 | Stop reason: HOSPADM

## 2025-02-13 RX ORDER — ONDANSETRON 2 MG/ML
4 INJECTION INTRAMUSCULAR; INTRAVENOUS
Status: DISCONTINUED | OUTPATIENT
Start: 2025-02-13 | End: 2025-02-13 | Stop reason: HOSPADM

## 2025-02-13 RX ORDER — ACETAMINOPHEN 325 MG/1
1000 TABLET ORAL ONCE
Status: COMPLETED | OUTPATIENT
Start: 2025-02-13 | End: 2025-02-13

## 2025-02-13 RX ORDER — HYDROMORPHONE HYDROCHLORIDE 1 MG/ML
0.5 INJECTION, SOLUTION INTRAMUSCULAR; INTRAVENOUS; SUBCUTANEOUS EVERY 5 MIN PRN
Status: DISCONTINUED | OUTPATIENT
Start: 2025-02-13 | End: 2025-02-13 | Stop reason: HOSPADM

## 2025-02-13 RX ORDER — LORAZEPAM 2 MG/ML
0.5 INJECTION INTRAMUSCULAR
Status: DISCONTINUED | OUTPATIENT
Start: 2025-02-13 | End: 2025-02-13 | Stop reason: HOSPADM

## 2025-02-13 RX ORDER — KETAMINE HCL IN NACL, ISO-OSM 20 MG/2 ML
SYRINGE (ML) INJECTION
Status: DISCONTINUED | OUTPATIENT
Start: 2025-02-13 | End: 2025-02-13 | Stop reason: SDUPTHER

## 2025-02-13 RX ORDER — LABETALOL HYDROCHLORIDE 5 MG/ML
10 INJECTION, SOLUTION INTRAVENOUS
Status: DISCONTINUED | OUTPATIENT
Start: 2025-02-13 | End: 2025-02-13 | Stop reason: HOSPADM

## 2025-02-13 RX ORDER — IPRATROPIUM BROMIDE AND ALBUTEROL SULFATE 2.5; .5 MG/3ML; MG/3ML
1 SOLUTION RESPIRATORY (INHALATION)
Status: DISCONTINUED | OUTPATIENT
Start: 2025-02-13 | End: 2025-02-13 | Stop reason: HOSPADM

## 2025-02-13 RX ORDER — KETOROLAC TROMETHAMINE 30 MG/ML
INJECTION, SOLUTION INTRAMUSCULAR; INTRAVENOUS
Status: DISCONTINUED | OUTPATIENT
Start: 2025-02-13 | End: 2025-02-13 | Stop reason: SDUPTHER

## 2025-02-13 RX ORDER — DIPHENHYDRAMINE HYDROCHLORIDE 50 MG/ML
12.5 INJECTION INTRAMUSCULAR; INTRAVENOUS
Status: DISCONTINUED | OUTPATIENT
Start: 2025-02-13 | End: 2025-02-13 | Stop reason: HOSPADM

## 2025-02-13 RX ORDER — MEPERIDINE HYDROCHLORIDE 25 MG/ML
12.5 INJECTION INTRAMUSCULAR; INTRAVENOUS; SUBCUTANEOUS EVERY 5 MIN PRN
Status: DISCONTINUED | OUTPATIENT
Start: 2025-02-13 | End: 2025-02-13 | Stop reason: HOSPADM

## 2025-02-13 RX ADMIN — Medication 20 MG: at 12:26

## 2025-02-13 RX ADMIN — PROPOFOL 20 MG: 10 INJECTION, EMULSION INTRAVENOUS at 12:20

## 2025-02-13 RX ADMIN — FENTANYL CITRATE 25 MCG: 50 INJECTION, SOLUTION INTRAMUSCULAR; INTRAVENOUS at 12:18

## 2025-02-13 RX ADMIN — MIDAZOLAM HYDROCHLORIDE 2 MG: 1 INJECTION, SOLUTION INTRAMUSCULAR; INTRAVENOUS at 12:06

## 2025-02-13 RX ADMIN — PROPOFOL 120 MCG/KG/MIN: 10 INJECTION, EMULSION INTRAVENOUS at 12:09

## 2025-02-13 RX ADMIN — ONDANSETRON 4 MG: 2 INJECTION INTRAMUSCULAR; INTRAVENOUS at 12:34

## 2025-02-13 RX ADMIN — SODIUM CHLORIDE, POTASSIUM CHLORIDE, SODIUM LACTATE AND CALCIUM CHLORIDE: 600; 310; 30; 20 INJECTION, SOLUTION INTRAVENOUS at 11:00

## 2025-02-13 RX ADMIN — FENTANYL CITRATE 25 MCG: 50 INJECTION, SOLUTION INTRAMUSCULAR; INTRAVENOUS at 12:15

## 2025-02-13 RX ADMIN — KETOROLAC TROMETHAMINE 30 MG: 30 INJECTION, SOLUTION INTRAMUSCULAR at 12:35

## 2025-02-13 RX ADMIN — METHOCARBAMOL 1000 MG: 100 INJECTION INTRAMUSCULAR; INTRAVENOUS at 13:08

## 2025-02-13 RX ADMIN — PROPOFOL 20 MG: 10 INJECTION, EMULSION INTRAVENOUS at 12:15

## 2025-02-13 RX ADMIN — ACETAMINOPHEN 975 MG: 325 TABLET ORAL at 10:39

## 2025-02-13 RX ADMIN — FENTANYL CITRATE 50 MCG: 50 INJECTION, SOLUTION INTRAMUSCULAR; INTRAVENOUS at 12:20

## 2025-02-13 RX ADMIN — PROPOFOL 20 MG: 10 INJECTION, EMULSION INTRAVENOUS at 12:26

## 2025-02-13 RX ADMIN — LIDOCAINE HYDROCHLORIDE 100 MG: 20 INJECTION, SOLUTION INFILTRATION; PERINEURAL at 12:15

## 2025-02-13 RX ADMIN — WATER 2000 MG: 1 INJECTION INTRAMUSCULAR; INTRAVENOUS; SUBCUTANEOUS at 12:17

## 2025-02-13 RX ADMIN — APREPITANT 40 MG: 40 CAPSULE ORAL at 10:58

## 2025-02-13 ASSESSMENT — PAIN - FUNCTIONAL ASSESSMENT: PAIN_FUNCTIONAL_ASSESSMENT: 0-10

## 2025-02-13 ASSESSMENT — PAIN SCALES - GENERAL: PAINLEVEL_OUTOF10: 0

## 2025-02-13 NOTE — DISCHARGE INSTRUCTIONS
Activity:  You may feel sleepy for the next 24 hours. This is due to the medication you received during your procedure. You should have a responsible adult with you for the rest of the day and during the night. This is for your own safety and protection. You may be up and about according to your instructions. You should be urinating every 4-6 hours as needed.    You may use stairs.  You may shower.  Nothing in the vagina: no sex or tampons for 2 weeks.    For the next 24 hours, you should:  Not drive a car  Not operate machinery or power tools  Not drink alcoholic beverages, including beer  Not make any important decisions or sign important papers     The following restrictions should be observed:  Take showers instead of tub baths  No hot tubs, whirlpools, bath tubs, swimming pools  No sexual activity until advised  Rest for the day, then resume normal activity as you are able  No tampons or douching until advised    What to expect  Cramping  Bleeding (intermittently up to 2-3 weeks)  Some soreness in legs and back     Diet:  Progress slowly to a regular diet, especially if you have had a general anesthetic. Start by taking liquids. If you have no nausea, try soup and crackers, then solid food.     Medications:  You may have some pain and or lower abdominal cramping.   Take medication with food to prevent an upset stomach.    You can use ibuprofen 600 mg (3 regular strength tablets) every 6 hours as needed for pain.    You can use Tylenol 1000 mg (2 extra strength tablets) every 8 hours as needed for pain with the ibuprofen.    Follow-up in the office with Dr. Bedoya in 2 weeks.    When to call:  If you have a temperature of 100.4 degrees Fahrenheit orally  If you have severe pain or cramping  If you have bleeding heavier than your normal period or you pass large clots  If you have foul smelling discharge  If you are having problems coping  If you have any problems or questions please call

## 2025-02-13 NOTE — H&P
Update History & Physical    The patient's History and Physical of February 7, 2025 was reviewed with the patient and I examined the patient. There was no change. The surgical site was confirmed by the patient and me.       Plan: The risks, benefits, expected outcome, and alternative to the recommended procedure have been discussed with the patient. Patient understands and wants to proceed with the procedure.     Electronically signed by RAUL JACOB MD on 2/13/2025 at 11:55 AM

## 2025-02-13 NOTE — FLOWSHEET NOTE
Pt arrived to pacu  at 1247. Pt resting in bed with eyes closed. Pt on RA at this time. Writer just assumed care of pt at this time. Pt has mesh panties and peripad in place that is c/d/I.

## 2025-02-13 NOTE — ANESTHESIA PRE PROCEDURE
no rashes , no jaundice present , good turgor , no masses , no tenderness on palpation                                              BP Readings from Last 3 Encounters:   02/13/25 128/78   02/07/25 116/78   01/28/25 126/82       NPO Status:                                                                                 BMI:   Wt Readings from Last 3 Encounters:   02/13/25 116.9 kg (257 lb 12.8 oz)   02/07/25 116.6 kg (257 lb)   01/28/25 116.6 kg (257 lb)     Body mass index is 51.2 kg/m².    CBC:   Lab Results   Component Value Date/Time    WBC 7.1 07/15/2021 10:07 AM    RBC 4.51 07/15/2021 10:07 AM    HGB 13.9 07/15/2021 10:07 AM    HCT 41.1 07/15/2021 10:07 AM    MCV 91.3 07/15/2021 10:07 AM    RDW 13.6 07/15/2021 10:07 AM     07/15/2021 10:07 AM       CMP:   Lab Results   Component Value Date/Time     07/15/2021 10:07 AM    K 4.5 07/15/2021 10:07 AM     07/15/2021 10:07 AM    CO2 22 07/15/2021 10:07 AM    BUN 10 07/15/2021 10:07 AM    CREATININE 0.9 07/15/2021 10:07 AM    GFRAA >60 07/15/2021 10:07 AM    GFRAA >60 03/30/2013 06:50 PM    AGRATIO 1.3 07/15/2021 10:07 AM    LABGLOM >60 07/15/2021 10:07 AM    LABGLOM >60 03/30/2013 06:50 PM    GLUCOSE 95 09/19/2023 06:35 AM    CALCIUM 9.7 07/15/2021 10:07 AM    BILITOT 0.5 07/15/2021 10:07 AM    ALKPHOS 73 07/15/2021 10:07 AM    AST 21 07/15/2021 10:07 AM    ALT 26 07/15/2021 10:07 AM       POC Tests: No results for input(s): \"POCGLU\", \"POCNA\", \"POCK\", \"POCCL\", \"POCBUN\", \"POCHEMO\", \"POCHCT\" in the last 72 hours.    Coags:   Lab Results   Component Value Date/Time    PROTIME 12.0 11/04/2016 11:46 AM    INR 1.05 11/04/2016 11:46 AM       HCG (If Applicable):   Lab Results   Component Value Date    PREGTESTUR Negative 02/13/2025    HCGQUANT 02880.0 02/02/2014        ABGs: No results found for: \"PHART\", \"PO2ART\", \"LYJ6LEO\", \"RBR1SJV\", \"BEART\", \"X8MIZMLD\"     Type & Screen (If Applicable):  No results found for: \"ABORH\", \"LABANTI\"    Drug/Infectious Status (If Applicable):  No results found for: \"HIV\", \"HEPCAB\"    COVID-19 Screening (If

## 2025-02-13 NOTE — OP NOTE
Operative Note      Patient: Le Narayanan  YOB: 1980  MRN: [id number]    Date of Procedure: 2/13/2025    Pre-Op Diagnosis Codes:      * Menorrhagia [N92.0]     * Presence of 52 mg levonorgestrel-releasing intrauterine device (IUD) [Z97.5]    Post-Op Diagnosis: Same       Procedure(s):  Hysteroscopy with dilation and curettage, endometrial ablation, removal of intrauterine device.    Surgeon(s):  Parag Bedoya MD    Assistant:   Surgical Assistant: Randell Stringer Viviana    Anesthesia: Monitor Anesthesia Care    Estimated Blood Loss (mL): Minimal    Complications: None    INDICATIONS: Le Narayanan is a 44 y.o. female who has completed childbearing and suffers from menorrhagia. She desires the proposed procedure.  She declines expectant management or medical management or alternative therapies. She was made aware of the risks of the procedure including but not limited to bleeding, blood clot, infection, uterine perforation, damage to internal organs, need for additional surgery, failure to diagnose and failure to control her bleeding to her satisfaction. All questions were answered and she wished to proceed.     OPERATIVE FINDINGS: Normal uterine cavity.   Uterine cavity measured 4.5 x 3.8 cm.    TECHNIQUE:   The patient was taken to the operating room and placed in supine position. Anesthesia was administered by anesthesia department. She was then placed in modified dorsal lithotomy position in candy cane stirrups.  Examination was performed. She was then prepped and draped in usual sterile fashion for the proposed procedure. Time-out was taken to positively identify the patient and confirm the procedure and confirm that the patient received prophylactic antibiotics.    IUD removal:  IUD strings were not present at the os.  Gentle cervical dilation was performed.  Uterine packing forceps were used to reach into the endocervical canal and the IUD strings are

## 2025-02-13 NOTE — ANESTHESIA POSTPROCEDURE EVALUATION
Department of Anesthesiology  Postprocedure Note    Patient: Le Narayanan  MRN: [id number]  YOB: 1980  Date of evaluation: 2/13/2025    Procedure Summary       Date: 02/13/25 Room / Location: [place] [id number] / [facility]    Anesthesia Start: 1206 Anesthesia Stop: 1250    Procedure: Hysteroscopy with dilation and curettage, endometrial ablation, removal of intrauterine device. (Vagina ) Diagnosis:       Menorrhagia      Presence of 52 mg levonorgestrel-releasing intrauterine device (IUD)      (Menorrhagia [N92.0])      (Presence of 52 mg levonorgestrel-releasing intrauterine device (IUD) [Z97.5])    Surgeons: Parag Bedoya MD Responsible Provider: Oliver Portillo MD    Anesthesia Type: MAC ASA Status: 2            Anesthesia Type: MAC    Mable Phase I: Mable Score: 10    Mable Phase II:      Anesthesia Post Evaluation    Patient location during evaluation: PACU  Patient participation: complete - patient participated  Level of consciousness: awake  Airway patency: patent  Nausea & Vomiting: no nausea and no vomiting  Cardiovascular status: blood pressure returned to baseline and hemodynamically stable  Respiratory status: acceptable  Hydration status: euvolemic  Multimodal analgesia pain management approach  Pain management: adequate    No notable events documented.

## 2025-02-13 NOTE — PROGRESS NOTES
PACU Transfer to [facility]    Vitals:    02/13/25 1400   BP: 122/84   Pulse: 56   Resp: 17   Temp: 98.2 °F (36.8 °C)   SpO2: 99%         Intake/Output Summary (Last 24 hours) at 2/13/2025 1403  Last data filed at 2/13/2025 1403  Gross per 24 hour   Intake 902 ml   Output 0 ml   Net 902 ml       Pain assessment:  none  Pain Level: 0    Patient transferred to care of [facility] RN.    2/13/2025 2:03 PM

## 2025-02-13 NOTE — FLOWSHEET NOTE
Ambulatory Surgery/Procedure Discharge Note    Vitals:    02/13/25 1407   BP: 136/74   Pulse: 57   Resp: 18   Temp: 98.6 °F (37 °C)   SpO2: 99%       In: 902 [I.V.:902]  Out: 0     Restroom use offered before discharge.  Yes    Pain assessment:  none  Pain Level: 0      Pt and family states \"ready to go home\". Pt alert and oriented x4. IV removed. Denies N/V or pain. Nadira pad intact, no drainage noted at this time. Pt tolerating po intake. Discharge instructions given to pt and mother with pt permission. Pt and mother verbalized understanding of all instructions. Left with all belongings and discharge instructions.   Patient discharged to home/self care. Patient discharged via wheel chair by transporter to waiting family.       2/13/2025 2:26 PM

## 2025-02-13 NOTE — FLOWSHEET NOTE
Pt c/o cramping. Pt resting with eyes closed. Dr. Turner called and said can give robaxin. Will give once verified.

## 2025-05-09 LAB
CHOLEST SERPL-MCNC: 150 MG/DL (ref 0–199)
GLUCOSE SERPL-MCNC: 78 MG/DL (ref 70–99)
HDLC SERPL-MCNC: 55 MG/DL (ref 40–60)
LDLC SERPL CALC-MCNC: 85 MG/DL
TRIGL SERPL-MCNC: 48 MG/DL (ref 0–150)

## 2025-06-02 DIAGNOSIS — M54.2 NECK PAIN: Primary | ICD-10-CM

## 2025-06-16 DIAGNOSIS — S16.1XXA STRAIN OF NECK MUSCLE, INITIAL ENCOUNTER: Primary | ICD-10-CM

## 2025-06-17 ENCOUNTER — HOSPITAL ENCOUNTER (OUTPATIENT)
Age: 45
Discharge: HOME | End: 2025-06-17
Payer: COMMERCIAL

## 2025-06-17 ENCOUNTER — HOSPITAL ENCOUNTER (OUTPATIENT)
Dept: GENERAL RADIOLOGY | Age: 45
Discharge: HOME | End: 2025-06-17
Payer: COMMERCIAL

## 2025-06-17 DIAGNOSIS — M54.2 NECK PAIN: ICD-10-CM

## 2025-06-17 PROCEDURE — 72040 X-RAY EXAM NECK SPINE 2-3 VW: CPT

## 2025-06-18 DIAGNOSIS — S16.1XXA STRAIN OF NECK MUSCLE, INITIAL ENCOUNTER: Primary | ICD-10-CM

## 2025-06-18 RX ORDER — METHYLPREDNISOLONE 4 MG/1
TABLET ORAL
Qty: 21 TABLET | Refills: 0 | Status: SHIPPED | OUTPATIENT
Start: 2025-06-18 | End: 2025-06-24

## 2025-06-20 DIAGNOSIS — M62.838 CERVICAL PARASPINAL MUSCLE SPASM: Primary | ICD-10-CM

## 2025-07-17 DIAGNOSIS — M62.838 CERVICAL PARASPINAL MUSCLE SPASM: ICD-10-CM

## 2025-07-17 DIAGNOSIS — J45.20 MILD INTERMITTENT ASTHMA WITHOUT COMPLICATION: ICD-10-CM

## 2025-07-17 DIAGNOSIS — S16.1XXA STRAIN OF NECK MUSCLE, INITIAL ENCOUNTER: ICD-10-CM

## 2025-07-18 RX ORDER — ALBUTEROL SULFATE AND BUDESONIDE 90; 80 UG/1; UG/1
AEROSOL, METERED RESPIRATORY (INHALATION)
Qty: 10.7 G | Refills: 1 | Status: SHIPPED | OUTPATIENT
Start: 2025-07-18

## 2025-07-18 RX ORDER — MONTELUKAST SODIUM 10 MG/1
10 TABLET ORAL DAILY
Qty: 90 TABLET | Refills: 3 | Status: SHIPPED | OUTPATIENT
Start: 2025-07-18

## 2025-07-18 RX ORDER — ALBUTEROL SULFATE 90 UG/1
2 INHALANT RESPIRATORY (INHALATION) EVERY 6 HOURS PRN
Qty: 3 EACH | Refills: 3 | Status: SHIPPED | OUTPATIENT
Start: 2025-07-18

## (undated) DEVICE — WET SKIN PREP TRAY: Brand: MEDLINE INDUSTRIES, INC.

## (undated) DEVICE — Device

## (undated) DEVICE — UNDERPANTS INCONT XL 45-70IN KNIT SEAMLESS DSGN COLOR-CODED

## (undated) DEVICE — JEWISH HOSPITAL TURNOVER KIT: Brand: MEDLINE INDUSTRIES, INC.

## (undated) DEVICE — SOLUTION INJ LR VISIV 1000ML BG

## (undated) DEVICE — GOWN,SIRUS,NONRNF,SETINSLV,2XL,18/CS: Brand: MEDLINE

## (undated) DEVICE — GARMENT,MEDLINE,DVT,INT,CALF,MED, GEN2: Brand: MEDLINE

## (undated) DEVICE — TOWEL,STOP FLAG GOLD N-W: Brand: MEDLINE

## (undated) DEVICE — TUBING, SUCTION, 1/4" X 12', STRAIGHT: Brand: MEDLINE

## (undated) DEVICE — DEVICE ABLATION NOVASUREHANDLE DISP

## (undated) DEVICE — PACK,LAPARASCOPY,PELVISCOPY,AURORA: Brand: MEDLINE

## (undated) DEVICE — GLOVE ORTHO 8   MSG9480

## (undated) DEVICE — PAD ADHESIVE ST TELFA  3X4IN

## (undated) DEVICE — SET ADMIN L104IN 18ML GRAV CK VLV RLER CLMP 2 SMRTSITE NDL

## (undated) DEVICE — SET IV EXTENSION 6IN

## (undated) DEVICE — DEVICE ABLATN ENDOMET US NOVASURE V5

## (undated) DEVICE — MATERNITY PAD,HEAVY: Brand: CURITY

## (undated) DEVICE — BAG INFUSION PRESSURE 1000 CC THMB WHL AIR CTRL DISP

## (undated) DEVICE — COVER LT HNDL BLU PLAS

## (undated) DEVICE — SET EXTN PRIMING 4.9ML L30IN INCL SLDE CLMP SPIN M LUERLOCK

## (undated) DEVICE — DRAPE,UNDERBUTTOCKS,PCH,STERILE: Brand: MEDLINE

## (undated) DEVICE — STRAP RESTRN W3.5XL18IN STD ALL PURP DISP W/ SLNG RNG

## (undated) DEVICE — STANDARD D&C: Brand: MEDLINE INDUSTRIES, INC.

## (undated) DEVICE — PRESSURE TUBING: Brand: TRUWAVE